# Patient Record
Sex: MALE | Race: WHITE | NOT HISPANIC OR LATINO | Employment: OTHER | URBAN - METROPOLITAN AREA
[De-identification: names, ages, dates, MRNs, and addresses within clinical notes are randomized per-mention and may not be internally consistent; named-entity substitution may affect disease eponyms.]

---

## 2017-06-13 ENCOUNTER — OFFICE VISIT (OUTPATIENT)
Dept: FAMILY MEDICINE | Facility: OTHER | Age: 72
End: 2017-06-13
Attending: FAMILY MEDICINE
Payer: MEDICARE

## 2017-06-13 VITALS
DIASTOLIC BLOOD PRESSURE: 68 MMHG | TEMPERATURE: 97.1 F | OXYGEN SATURATION: 98 % | HEART RATE: 66 BPM | RESPIRATION RATE: 16 BRPM | HEIGHT: 71 IN | SYSTOLIC BLOOD PRESSURE: 104 MMHG | BODY MASS INDEX: 27.58 KG/M2 | WEIGHT: 197 LBS

## 2017-06-13 DIAGNOSIS — M21.612 BUNION, LEFT FOOT: ICD-10-CM

## 2017-06-13 DIAGNOSIS — R51.9 NONINTRACTABLE EPISODIC HEADACHE, UNSPECIFIED HEADACHE TYPE: ICD-10-CM

## 2017-06-13 DIAGNOSIS — R52 PAIN: ICD-10-CM

## 2017-06-13 DIAGNOSIS — E78.5 HYPERLIPIDEMIA LDL GOAL <100: Primary | ICD-10-CM

## 2017-06-13 DIAGNOSIS — Z13.6 SCREENING FOR AAA (ABDOMINAL AORTIC ANEURYSM): ICD-10-CM

## 2017-06-13 LAB
ALT SERPL W P-5'-P-CCNC: 27 U/L (ref 0–70)
CHOLEST SERPL-MCNC: 166 MG/DL
ERYTHROCYTE [DISTWIDTH] IN BLOOD BY AUTOMATED COUNT: 12.8 % (ref 10–15)
GLUCOSE SERPL-MCNC: 93 MG/DL (ref 70–99)
HCT VFR BLD AUTO: 40.8 % (ref 40–53)
HDLC SERPL-MCNC: 65 MG/DL
HGB BLD-MCNC: 14.1 G/DL (ref 13.3–17.7)
LDLC SERPL CALC-MCNC: 87 MG/DL
MCH RBC QN AUTO: 32 PG (ref 26.5–33)
MCHC RBC AUTO-ENTMCNC: 34.6 G/DL (ref 31.5–36.5)
MCV RBC AUTO: 93 FL (ref 78–100)
NONHDLC SERPL-MCNC: 101 MG/DL
PLATELET # BLD AUTO: 260 10E9/L (ref 150–450)
RBC # BLD AUTO: 4.41 10E12/L (ref 4.4–5.9)
TRIGL SERPL-MCNC: 71 MG/DL
TSH SERPL DL<=0.05 MIU/L-ACNC: 0.95 MU/L (ref 0.4–4)
WBC # BLD AUTO: 4.9 10E9/L (ref 4–11)

## 2017-06-13 PROCEDURE — 84443 ASSAY THYROID STIM HORMONE: CPT | Mod: ZL | Performed by: FAMILY MEDICINE

## 2017-06-13 PROCEDURE — 82947 ASSAY GLUCOSE BLOOD QUANT: CPT | Mod: ZL | Performed by: FAMILY MEDICINE

## 2017-06-13 PROCEDURE — 99214 OFFICE O/P EST MOD 30 MIN: CPT | Performed by: FAMILY MEDICINE

## 2017-06-13 PROCEDURE — 80061 LIPID PANEL: CPT | Mod: ZL | Performed by: FAMILY MEDICINE

## 2017-06-13 PROCEDURE — 86618 LYME DISEASE ANTIBODY: CPT | Mod: ZL | Performed by: FAMILY MEDICINE

## 2017-06-13 PROCEDURE — 85027 COMPLETE CBC AUTOMATED: CPT | Mod: ZL | Performed by: FAMILY MEDICINE

## 2017-06-13 PROCEDURE — 36415 COLL VENOUS BLD VENIPUNCTURE: CPT | Mod: ZL | Performed by: FAMILY MEDICINE

## 2017-06-13 PROCEDURE — 99212 OFFICE O/P EST SF 10 MIN: CPT

## 2017-06-13 PROCEDURE — 84460 ALANINE AMINO (ALT) (SGPT): CPT | Mod: ZL | Performed by: FAMILY MEDICINE

## 2017-06-13 RX ORDER — SIMVASTATIN 40 MG
40 TABLET ORAL AT BEDTIME
Qty: 90 TABLET | Refills: 3 | Status: SHIPPED | OUTPATIENT
Start: 2017-06-13 | End: 2018-06-13

## 2017-06-13 ASSESSMENT — PAIN SCALES - GENERAL: PAINLEVEL: NO PAIN (1)

## 2017-06-13 NOTE — NURSING NOTE
"Chief Complaint   Patient presents with     Recheck Medication     needs refills     Headache     onset march april 2 a week apart may only had on the 25th.  none since      Foot Pain     left foot bunion        Initial /68 (BP Location: Left arm, Patient Position: Chair, Cuff Size: Adult Large)  Pulse 66  Temp 97.1  F (36.2  C) (Tympanic)  Resp 16  Ht 5' 10.5\" (1.791 m)  Wt 197 lb (89.4 kg)  SpO2 98%  BMI 27.87 kg/m2 Estimated body mass index is 27.87 kg/(m^2) as calculated from the following:    Height as of this encounter: 5' 10.5\" (1.791 m).    Weight as of this encounter: 197 lb (89.4 kg).  Medication Reconciliation: complete   Pamela M Lechevalier LPN      "

## 2017-06-13 NOTE — MR AVS SNAPSHOT
After Visit Summary   6/13/2017    Napoleon Leroy    MRN: [id number]           Patient Information     Date Of Birth          1945        Visit Information        Provider Department      6/13/2017 10:30 AM Marizol Bell MD [facility]        Today's Diagnoses     Hyperlipidemia LDL goal <100    -  1    Bunion, left foot        Nonintractable episodic headache, unspecified headache type        Pain         Screening for AAA (abdominal aortic aneurysm)           Follow-ups after your visit        Additional Services     PODIATRY/FOOT & ANKLE SURGERY REFERRAL       Your provider has referred you to: Dr. Petr Hummel    Please be aware that coverage of these services is subject to the terms and limitations of your health insurance plan.  Call member services at your health plan with any benefit or coverage questions.      Please bring the following to your appointment:  >>   Any x-rays, CTs or MRIs which have been performed.  Contact the facility where they were done to arrange for  prior to your scheduled appointment.    >>   List of current medications   >>   This referral request   >>   Any documents/labs given to you for this referral                  Follow-up notes from your care team     Return in about 6 months (around 12/13/2017).      Who to contact     If you have questions or need follow up information about today's clinic visit or your schedule please contact [facility] directly at [phone].  Normal or non-critical lab and imaging results will be communicated to you by MyChart, letter or phone within 4 business days after the clinic has received the results. If you do not hear from us within 7 days, please contact the clinic through MyChart or phone. If you have a critical or abnormal lab result, we will notify you by phone as soon as possible.  Submit refill requests through [employer] or call your pharmacy and they will forward the refill  "request to us. Please allow 3 business days for your refill to be completed.          Additional Information About Your Visit        [employer] Information     [employer] lets you send messages to your doctor, view your test results, renew your prescriptions, schedule appointments and more. To sign up, go to [email or web address] . Click on \"Log in\" on the left side of the screen, which will take you to the Welcome page. Then click on \"Sign up Now\" on the right side of the page.     You will be asked to enter the access code listed below, as well as some personal information. Please follow the directions to create your username and password.     Your access code is: [email or web address]  Expires: 2017  3:11 PM     Your access code will  in 90 days. If you need help or a new code, please call your [facility] clinic or [phone].        Care EveryWhere ID     This is your Care EveryWhere ID. This could be used by other organizations to access your [facility] medical records  [id number]        Your Vitals Were     Pulse Temperature Respirations Height Pulse Oximetry BMI (Body Mass Index)    66 97.1  F (36.2  C) (Tympanic) 16 5' 10.5\" (1.791 m) 98% 27.87 kg/m2       Blood Pressure from Last 3 Encounters:   17 104/68   16 116/70   08/19/15 118/60    Weight from Last 3 Encounters:   17 197 lb (89.4 kg)   16 202 lb (91.6 kg)   08/19/15 210 lb (95.3 kg)              We Performed the Following     Abdominal Aortic Aneurysm Screening/Tracking     ALT     CBC with platelets     Glucose     Lipid Profile     Lyme Disease Anju with reflex to WB Serum     PODIATRY/FOOT & ANKLE SURGERY REFERRAL     TSH          Where to get your medicines      These medications were sent to [facility] - [address] [address]     Phone:  [phone]     simvastatin 40 MG tablet          Primary Care Provider Office Phone # Fax #    Marizol Roque " MD [place] [phone]       [facility] [address] [facility] [address]        Thank you!     Thank you for choosing [facility]  for your care. Our goal is always to provide you with excellent care. Hearing back from our patients is one way we can continue to improve our services. Please take a few minutes to complete the written survey that you may receive in the mail after your visit with us. Thank you!             Your Updated Medication List - Protect others around you: Learn how to safely use, store and throw away your medicines at www.disposemymeds.org.          This list is accurate as of: 6/13/17  3:11 PM.  Always use your most recent med list.                   Brand Name Dispense Instructions for use    aspirin 81 MG tablet      Take by mouth daily       DAILY MULTIVITAMIN PO      Take by mouth daily       fish oil-omega-3 fatty acids 1000 MG capsule      Take 1 g by mouth daily       GARLIC PO      0.5 CLOVE DAILY       IBUPROFEN PO      Take 200 mg by mouth Takes 2 tabs as needed       simvastatin 40 MG tablet    ZOCOR    90 tablet    Take 1 tablet (40 mg) by mouth At Bedtime       UNABLE TO FIND      daily cinnamine       VITAMIN C PO      Take 500 mg by mouth daily       VITAMIN D3 PO      Take by mouth daily       zinc sulfate 220 (50 ZN) MG capsule    ZINCATE     Take 220 mg by mouth daily

## 2017-06-13 NOTE — LETTER
[facility]  [address]  [place] [address]  [phone]    Angelic 15, 2017    Napoleon Leroy  [address] [place] [address]    Dear Napoleon,    Notes Recorded by Marizol Bell MD on 6/15/2017 at 1:22 PM  Normal/negative results     Results for orders placed or performed in visit on 06/13/17   Abdominal Aortic Aneurysm Screening/Tracking    Narrative    Pt has never smoked, no family history of AAA; does not meet criteria for screening     Lipid Profile   Result Value Ref Range    Cholesterol 166 <200 mg/dL    Triglycerides 71 <150 mg/dL    HDL Cholesterol 65 >39 mg/dL    LDL Cholesterol Calculated 87 <100 mg/dL    Non HDL Cholesterol 101 <130 mg/dL   ALT   Result Value Ref Range    ALT 27 0 - 70 U/L   Glucose   Result Value Ref Range    Glucose 93 70 - 99 mg/dL   CBC with platelets   Result Value Ref Range    WBC 4.9 4.0 - 11.0 10e9/L    RBC Count 4.41 4.4 - 5.9 10e12/L    Hemoglobin 14.1 13.3 - 17.7 g/dL    Hematocrit 40.8 40.0 - 53.0 %    MCV 93 78 - 100 fl    MCH 32.0 26.5 - 33.0 pg    MCHC 34.6 31.5 - 36.5 g/dL    RDW 12.8 10.0 - 15.0 %    Platelet Count 260 150 - 450 10e9/L   TSH   Result Value Ref Range    TSH 0.95 0.40 - 4.00 mU/L   Lyme Disease Anju with reflex to WB Serum   Result Value Ref Range    Lyme Disease Antibodies Serum 0.08 0.00 - 0.89

## 2017-06-13 NOTE — PROGRESS NOTES
SUBJECTIVE:                                                    Napoleon Leroy is a 72 year old male who presents to clinic today for the following health issues:    Hyperlipidemia Follow-Up      Rate your low fat/cholesterol diet?: good    Taking statin?  Yes, no muscle aches from statin    Other lipid medications/supplements?:  none     Headaches      Duration: few months    Description  Location: bilateral in the frontal area   Character: dull pain  Frequency:  One headache, then another about a week later, happened in March and April, then only one headache at the end of May and none since  Duration:  Last a few hours, then resolves    Intensity:  moderate    Accompanying signs and symptoms: none    Precipitating or Alleviating factors:  Nausea/vomiting: no  Dizziness: no  Weakness or numbness: no  Visual changes: none  Fever: no   Sinus or URI symptoms no     History  Head trauma: no  Family history of migraines: no  Previous tests for headaches: no  Neurologist evaluations: no  Able to do daily activities when headache present: YES  Wake with headaches: no  Daily pain medication use: no  Any changes in: none    Precipitating or Alleviating factors (light/sound/sleep/caffeine): ASA helps a little    Therapies tried and outcome: ASA    Outcome - mild improvement  Frequent/daily pain medication use: no     Musculoskeletal problem/pain      Duration: years    Description  Location: left foot at 1st MTP joint    Intensity:  moderate    Accompanying signs and symptoms: none    History  Previous similar problem: no   Previous evaluation:  Podiatry appointment many years ago    Precipitating or alleviating factors:  Trauma or overuse: no   Aggravating factors include: walking    Therapies tried and outcome: inserts help, but if walks without inserts, pain worsens       Problem list and histories reviewed & adjusted, as indicated.  Additional history: as documented    Patient Active Problem List   Diagnosis      Advanced care planning/counseling discussion     Hyperlipidemia     Osteoarthritis of shoulder     Past Surgical History:   Procedure Laterality Date     casting      fracture-arm, RT     COLONOSCOPY  2003     COLONOSCOPY N/A 3/20/2015    Procedure: COLONOSCOPY;  Surgeon: Julio Hinds DO;  Location: [facility]     inguinal hernia repair       ORTHOPEDIC SURGERY Left 4-    Total shoulder arthoscopy     TONSILLECTOMY         Social History   Substance Use Topics     Smoking status: Never Smoker     Smokeless tobacco: Never Used      Comment: no passive exposure     Alcohol use No     Family History   Problem Relation Age of Onset     HEART DISEASE Father 48     disease, cause of death     Myocardial Infarction Mother 65     myocardial infarction, cause of death     CANCER Sister 32     hodgkin's disease, cause of death         Current Outpatient Prescriptions   Medication Sig Dispense Refill     UNABLE TO FIND daily cinnamine       simvastatin (ZOCOR) 40 MG tablet Take 1 tablet (40 mg) by mouth At Bedtime 90 tablet 3     Cholecalciferol (VITAMIN D3 PO) Take by mouth daily       Ascorbic Acid (VITAMIN C PO) Take 500 mg by mouth daily       zinc sulfate (ZINCATE) 220 MG capsule Take 220 mg by mouth daily       IBUPROFEN PO Take 200 mg by mouth Takes 2 tabs as needed       aspirin 81 MG tablet Take by mouth daily       Multiple Vitamin (DAILY MULTIVITAMIN PO) Take by mouth daily       fish oil-omega-3 fatty acids (FISH OIL) 1000 MG capsule Take 1 g by mouth daily       GARLIC PO 0.5 CLOVE DAILY       [DISCONTINUED] simvastatin (ZOCOR) 40 MG tablet Take 1 tablet (40 mg) by mouth At Bedtime 90 tablet 3     No Known Allergies    Reviewed and updated as needed this visit by clinical staff  Tobacco  Allergies  Meds  Problems       Reviewed and updated as needed this visit by Provider         ROS:  Constitutional, HEENT, cardiovascular, pulmonary, gi and gu systems are negative, except as otherwise  "noted.    OBJECTIVE:                                                    /68 (BP Location: Left arm, Patient Position: Chair, Cuff Size: Adult Large)  Pulse 66  Temp 97.1  F (36.2  C) (Tympanic)  Resp 16  Ht 5' 10.5\" (1.791 m)  Wt 197 lb (89.4 kg)  SpO2 98%  BMI 27.87 kg/m2  Body mass index is 27.87 kg/(m^2).  GENERAL: healthy, alert and no distress  MS: significant bunion formation with angulation on left foot  PSYCH: mentation appears normal and affect normal/bright    Diagnostic Test Results:  none      ASSESSMENT/PLAN:                                                    Hyperlipidemia; controlled   Plan:  Labs:   See orders        ICD-10-CM    1. Hyperlipidemia LDL goal <100 E78.5 simvastatin (ZOCOR) 40 MG tablet     Lipid Profile     ALT     Glucose   2. Bunion, left foot M21.612 PODIATRY/FOOT & ANKLE SURGERY REFERRAL   3. Nonintractable episodic headache, unspecified headache type R51 CBC with platelets     TSH     Lyme Disease Anju with reflex to WB Serum   4. Pain  R52 TSH       FUTURE APPOINTMENTS:       - Follow-up visit in 6 months, sooner as needed.      Marizol Bell MD  [facility]    "

## 2017-06-14 ENCOUNTER — DOCUMENTATION ONLY (OUTPATIENT)
Dept: VASCULAR SURGERY | Facility: CLINIC | Age: 72
End: 2017-06-14

## 2017-06-15 LAB — B BURGDOR IGG+IGM SER QL: 0.08 (ref 0–0.89)

## 2017-06-19 ENCOUNTER — TELEPHONE (OUTPATIENT)
Dept: FAMILY MEDICINE | Facility: OTHER | Age: 72
End: 2017-06-19

## 2017-06-19 NOTE — TELEPHONE ENCOUNTER
11:21 AM    Reason for Call: Phone Call    Description: Pt called and states that he has some questions about his refills on his medication.    Was an appointment offered for this call? No    Preferred method for responding to this message: Telephone Call    If we cannot reach you directly, may we leave a detailed response at the number you provided? Yes    Can this message wait until your PCP/provider returns, if available today? Not applicable,     Rosio Abdullahi

## 2018-06-13 ENCOUNTER — OFFICE VISIT (OUTPATIENT)
Dept: FAMILY MEDICINE | Facility: OTHER | Age: 73
End: 2018-06-13
Attending: FAMILY MEDICINE
Payer: MEDICARE

## 2018-06-13 VITALS
BODY MASS INDEX: 29.96 KG/M2 | DIASTOLIC BLOOD PRESSURE: 72 MMHG | SYSTOLIC BLOOD PRESSURE: 114 MMHG | RESPIRATION RATE: 14 BRPM | HEIGHT: 71 IN | HEART RATE: 60 BPM | WEIGHT: 214 LBS | TEMPERATURE: 97.3 F

## 2018-06-13 DIAGNOSIS — E78.5 HYPERLIPIDEMIA LDL GOAL <100: Primary | ICD-10-CM

## 2018-06-13 DIAGNOSIS — R51.9 NONINTRACTABLE EPISODIC HEADACHE, UNSPECIFIED HEADACHE TYPE: ICD-10-CM

## 2018-06-13 DIAGNOSIS — Z23 NEED FOR VACCINATION WITH 13-POLYVALENT PNEUMOCOCCAL CONJUGATE VACCINE: ICD-10-CM

## 2018-06-13 DIAGNOSIS — Z11.59 NEED FOR HEPATITIS C SCREENING TEST: ICD-10-CM

## 2018-06-13 DIAGNOSIS — Z23 NEED FOR TETANUS BOOSTER: ICD-10-CM

## 2018-06-13 LAB
ALT SERPL W P-5'-P-CCNC: 21 U/L (ref 0–70)
CHOLEST SERPL-MCNC: 193 MG/DL
HDLC SERPL-MCNC: 59 MG/DL
LDLC SERPL CALC-MCNC: 116 MG/DL
NONHDLC SERPL-MCNC: 134 MG/DL
TRIGL SERPL-MCNC: 91 MG/DL

## 2018-06-13 PROCEDURE — G0472 HEP C SCREEN HIGH RISK/OTHER: HCPCS | Mod: ZL | Performed by: FAMILY MEDICINE

## 2018-06-13 PROCEDURE — 36415 COLL VENOUS BLD VENIPUNCTURE: CPT | Mod: ZL | Performed by: FAMILY MEDICINE

## 2018-06-13 PROCEDURE — 90471 IMMUNIZATION ADMIN: CPT | Mod: GY | Performed by: FAMILY MEDICINE

## 2018-06-13 PROCEDURE — 80061 LIPID PANEL: CPT | Mod: ZL | Performed by: FAMILY MEDICINE

## 2018-06-13 PROCEDURE — 99214 OFFICE O/P EST MOD 30 MIN: CPT | Performed by: FAMILY MEDICINE

## 2018-06-13 PROCEDURE — G0463 HOSPITAL OUTPT CLINIC VISIT: HCPCS | Mod: 25

## 2018-06-13 PROCEDURE — 84460 ALANINE AMINO (ALT) (SGPT): CPT | Mod: ZL | Performed by: FAMILY MEDICINE

## 2018-06-13 PROCEDURE — G0463 HOSPITAL OUTPT CLINIC VISIT: HCPCS

## 2018-06-13 PROCEDURE — 90714 TD VACC NO PRESV 7 YRS+ IM: CPT | Mod: GY | Performed by: FAMILY MEDICINE

## 2018-06-13 RX ORDER — SUMATRIPTAN 100 MG/1
100 TABLET, FILM COATED ORAL
Qty: 9 TABLET | Refills: 1 | Status: SHIPPED | OUTPATIENT
Start: 2018-06-13 | End: 2020-11-27

## 2018-06-13 RX ORDER — SIMVASTATIN 40 MG
40 TABLET ORAL AT BEDTIME
Qty: 90 TABLET | Refills: 3 | Status: SHIPPED | OUTPATIENT
Start: 2018-06-13 | End: 2019-06-21

## 2018-06-13 ASSESSMENT — ANXIETY QUESTIONNAIRES
2. NOT BEING ABLE TO STOP OR CONTROL WORRYING: NOT AT ALL
3. WORRYING TOO MUCH ABOUT DIFFERENT THINGS: NOT AT ALL
6. BECOMING EASILY ANNOYED OR IRRITABLE: NOT AT ALL
GAD7 TOTAL SCORE: 0
1. FEELING NERVOUS, ANXIOUS, OR ON EDGE: NOT AT ALL
4. TROUBLE RELAXING: NOT AT ALL
7. FEELING AFRAID AS IF SOMETHING AWFUL MIGHT HAPPEN: NOT AT ALL
5. BEING SO RESTLESS THAT IT IS HARD TO SIT STILL: NOT AT ALL

## 2018-06-13 ASSESSMENT — PAIN SCALES - GENERAL: PAINLEVEL: NO PAIN (0)

## 2018-06-13 NOTE — PROGRESS NOTES
SUBJECTIVE:   Napoleon Leroy is a 73 year old male who presents to clinic today for the following health issues:      Hyperlipidemia Follow-Up      Rate your low fat/cholesterol diet?: fair    Taking statin?  Yes, no muscle aches from statin    Other lipid medications/supplements?:  Fish oil/Omega 3, dose 1000 without side effects      Amount of exercise or physical activity: 6-7 days/week for an average of greater than 60 minutes    Problems taking medications regularly: No    Medication side effects: none    Diet: regular (no restrictions)        Headaches      Duration: over a year    Description  Location: bilateral in the frontal area   Character: dull pain  Frequency:  Once a month or every other month    Intensity:  moderate    Accompanying signs and symptoms:    Precipitating or Alleviating factors:  Nausea/vomiting: no  Dizziness: no  Weakness or numbness: no  Visual changes: none  Fever: no   Sinus or URI symptoms no     History  Head trauma: no  Family history of migraines: no  Previous tests for headaches: no  Neurologist evaluations: no  Able to do daily activities when headache present: YES  Wake with headaches: no  Daily pain medication use: no  Any changes in: none    Precipitating or Alleviating factors (light/sound/sleep/caffeine): none    Therapies tried and outcome: Ibuprofen (Advil, Motrin)    Outcome - minimally effective  Frequent/daily pain medication use: no        Problem list and histories reviewed & adjusted, as indicated.  Additional history: as documented    Patient Active Problem List   Diagnosis     Advanced care planning/counseling discussion     Hyperlipidemia     Osteoarthritis of shoulder     Past Surgical History:   Procedure Laterality Date     casting      fracture-arm, RT     COLONOSCOPY  2003     COLONOSCOPY N/A 3/20/2015    Procedure: COLONOSCOPY;  Surgeon: Julio Hinds DO;  Location: [facility]     inguinal hernia repair       ORTHOPEDIC SURGERY Left 4-     "Total shoulder arthoscopy     TONSILLECTOMY         Social History   Substance Use Topics     Smoking status: Never Smoker     Smokeless tobacco: Never Used      Comment: no passive exposure     Alcohol use No     Family History   Problem Relation Age of Onset     HEART DISEASE Father 48     disease, cause of death     Myocardial Infarction Mother 65     myocardial infarction, cause of death     CANCER Sister 32     hodgkin's disease, cause of death         Current Outpatient Prescriptions   Medication Sig Dispense Refill     Ascorbic Acid (VITAMIN C PO) Take 500 mg by mouth daily       aspirin 81 MG tablet Take by mouth daily       Cholecalciferol (VITAMIN D3 PO) Take by mouth daily       fish oil-omega-3 fatty acids (FISH OIL) 1000 MG capsule Take 1 g by mouth daily       GARLIC PO 0.5 CLOVE DAILY       IBUPROFEN PO Take 200 mg by mouth Takes 2 tabs as needed       Multiple Vitamin (DAILY MULTIVITAMIN PO) Take by mouth daily       simvastatin (ZOCOR) 40 MG tablet Take 1 tablet (40 mg) by mouth At Bedtime 90 tablet 3     SUMAtriptan (IMITREX) 100 MG tablet Take 1 tablet (100 mg) by mouth at onset of headache for migraine May repeat in 2 hours. Max 2 tablets/24 hours. 9 tablet 1     UNABLE TO FIND daily cinnamine       zinc sulfate (ZINCATE) 220 MG capsule Take 220 mg by mouth daily       [DISCONTINUED] simvastatin (ZOCOR) 40 MG tablet Take 1 tablet (40 mg) by mouth At Bedtime 90 tablet 3     No Known Allergies    Reviewed and updated as needed this visit by clinical staff  Tobacco  Allergies  Meds  Problems       Reviewed and updated as needed this visit by Provider         ROS:  Constitutional, HEENT, cardiovascular, pulmonary, gi and gu systems are negative, except as otherwise noted.    OBJECTIVE:     /72 (BP Location: Left arm, Patient Position: Sitting, Cuff Size: Adult Regular)  Pulse 60  Temp 97.3  F (36.3  C) (Tympanic)  Resp 14  Ht 5' 10.5\" (1.791 m)  Wt 214 lb (97.1 kg)  BMI 30.27 " kg/m2  Body mass index is 30.27 kg/(m^2).  GENERAL: healthy, alert and no distress  PSYCH: mentation appears normal, affect normal/bright    Diagnostic Test Results:  none     ASSESSMENT/PLAN:     Hyperlipidemia; controlled   Plan:  Labs:   Lipid and ALT        ICD-10-CM    1. Hyperlipidemia LDL goal <100 E78.5 simvastatin (ZOCOR) 40 MG tablet     Lipid Profile     ALT   2. Nonintractable episodic headache, unspecified headache type R51 SUMAtriptan (IMITREX) 100 MG tablet   3. Need for hepatitis C screening test Z11.59 Hepatitis C Screen Reflex to HCV RNA Quant and Genotype   4. Need for vaccination with 13-polyvalent pneumococcal conjugate vaccine Z23    5. Need for tetanus booster Z23 TD PRESERV FREE, IM (7+ YRS)     ADMIN 1st VACCINE       FUTURE APPOINTMENTS:       - Follow-up for annual visit or as needed    Marizol Bell MD  [facility]

## 2018-06-13 NOTE — MR AVS SNAPSHOT
"              After Visit Summary   6/13/2018    Napoleon Leroy    MRN: [id number]           Patient Information     Date Of Birth          1945        Visit Information        Provider Department      6/13/2018 10:15 AM Marizol Bell MD [facility]        Today's Diagnoses     Hyperlipidemia LDL goal <100    -  1    Nonintractable episodic headache, unspecified headache type        Need for hepatitis C screening test        Need for vaccination with 13-polyvalent pneumococcal conjugate vaccine        Need for tetanus booster           Follow-ups after your visit        Follow-up notes from your care team     Return in about 1 year (around 6/13/2019).      Who to contact     If you have questions or need follow up information about today's clinic visit or your schedule please contact [facility] directly at [phone].  Normal or non-critical lab and imaging results will be communicated to you by MyChart, letter or phone within 4 business days after the clinic has received the results. If you do not hear from us within 7 days, please contact the clinic through MyChart or phone. If you have a critical or abnormal lab result, we will notify you by phone as soon as possible.  Submit refill requests through [employer] or call your pharmacy and they will forward the refill request to us. Please allow 3 business days for your refill to be completed.          Additional Information About Your Visit        Care EveryWhere ID     This is your Care EveryWhere ID. This could be used by other organizations to access your [facility] medical records  [id number]        Your Vitals Were     Pulse Temperature Respirations Height BMI (Body Mass Index)       60 97.3  F (36.3  C) (Tympanic) 14 5' 10.5\" (1.791 m) 30.27 kg/m2        Blood Pressure from Last 3 Encounters:   06/13/18 114/72   06/13/17 104/68   04/21/16 116/70    Weight from Last 3 Encounters:   06/13/18 214 lb (97.1 kg)   06/13/17 197 " lb (89.4 kg)   04/21/16 202 lb (91.6 kg)              We Performed the Following     ADMIN 1st VACCINE     ALT     Hepatitis C Screen Reflex to HCV RNA Quant and Genotype     Lipid Profile     TD PRESERV FREE, IM (7+ YRS)          Today's Medication Changes          These changes are accurate as of 6/13/18  1:28 PM.  If you have any questions, ask your nurse or doctor.               Start taking these medicines.        Dose/Directions    SUMAtriptan 100 MG tablet   Commonly known as:  IMITREX   Used for:  Nonintractable episodic headache, unspecified headache type   Started by:  Marizol Bell MD        Dose:  100 mg   Take 1 tablet (100 mg) by mouth at onset of headache for migraine May repeat in 2 hours. Max 2 tablets/24 hours.   Quantity:  9 tablet   Refills:  1            Where to get your medicines      These medications were sent to [facility] - [address] [address]     Phone:  [phone]     simvastatin 40 MG tablet    SUMAtriptan 100 MG tablet                Primary Care Provider Office Phone # Fax #    Marizol Bell [phone] [address] [facility] [address]        Equal Access to Services     [facility] FRANCISCO AH: Hadii eli prestono Sosunshine, waaxda luqadaha, qaybta kaalmada adeegyada, kamran nguyen. So [facility] [phone].    ATENCIÓN: Si habla español, tiene a carson disposición servicios gratuitos de asistencia lingüística. Matilde al [phone].    We comply with applicable federal civil rights laws and [place] laws. We do not discriminate on the basis of race, color, national origin, age, disability, sex, sexual orientation, or gender identity.            Thank you!     Thank you for choosing [facility]  for your care. Our goal is always to provide you with excellent care. Hearing back from our patients is one way we can continue to  improve our services. Please take a few minutes to complete the written survey that you may receive in the mail after your visit with us. Thank you!             Your Updated Medication List - Protect others around you: Learn how to safely use, store and throw away your medicines at www.disposemymeds.org.          This list is accurate as of 6/13/18  1:28 PM.  Always use your most recent med list.                   Brand Name Dispense Instructions for use Diagnosis    aspirin 81 MG tablet      Take by mouth daily        DAILY MULTIVITAMIN PO      Take by mouth daily        fish oil-omega-3 fatty acids 1000 MG capsule      Take 1 g by mouth daily        GARLIC PO      0.5 CLOVE DAILY        IBUPROFEN PO      Take 200 mg by mouth Takes 2 tabs as needed        simvastatin 40 MG tablet    ZOCOR    90 tablet    Take 1 tablet (40 mg) by mouth At Bedtime    Hyperlipidemia LDL goal <100       SUMAtriptan 100 MG tablet    IMITREX    9 tablet    Take 1 tablet (100 mg) by mouth at onset of headache for migraine May repeat in 2 hours. Max 2 tablets/24 hours.    Nonintractable episodic headache, unspecified headache type       UNABLE TO FIND      daily cinnamine        VITAMIN C PO      Take 500 mg by mouth daily        VITAMIN D3 PO      Take by mouth daily        zinc sulfate 220 (50 Zn) MG capsule    ZINCATE     Take 220 mg by mouth daily

## 2018-06-13 NOTE — LETTER
June 14, 2018      Napoleon Herrmann  [address]  [place] [address]        Dear Napoleon,         Results for orders placed or performed in visit on 06/13/18   Lipid Profile   Result Value Ref Range    Cholesterol 193 <200 mg/dL    Triglycerides 91 <150 mg/dL    HDL Cholesterol 59 >39 mg/dL    LDL Cholesterol Calculated 116 (H) <100 mg/dL    Non HDL Cholesterol 134 (H) <130 mg/dL   ALT   Result Value Ref Range    ALT 21 0 - 70 U/L   Hepatitis C Screen Reflex to HCV RNA Quant and Genotype   Result Value Ref Range    Hepatitis C Antibody Nonreactive NR^Nonreactive               Sincerely,        Marizol Bell MD

## 2018-06-13 NOTE — LETTER
June 14, 2018      Napoleon QUARLES [facility]  [address]  [place] [address]        Dear Napoleon,     Normal/negative results.  Results for orders placed or performed in visit on 06/13/18   Lipid Profile   Result Value Ref Range    Cholesterol 193 <200 mg/dL    Triglycerides 91 <150 mg/dL    HDL Cholesterol 59 >39 mg/dL    LDL Cholesterol Calculated 116 (H) <100 mg/dL    Non HDL Cholesterol 134 (H) <130 mg/dL   ALT   Result Value Ref Range    ALT 21 0 - 70 U/L   Hepatitis C Screen Reflex to HCV RNA Quant and Genotype   Result Value Ref Range    Hepatitis C Antibody Nonreactive NR^Nonreactive               Sincerely,        Marizol Bell MD

## 2018-06-14 LAB — HCV AB SERPL QL IA: NONREACTIVE

## 2018-06-14 ASSESSMENT — ANXIETY QUESTIONNAIRES: GAD7 TOTAL SCORE: 0

## 2018-06-14 ASSESSMENT — PATIENT HEALTH QUESTIONNAIRE - PHQ9: SUM OF ALL RESPONSES TO PHQ QUESTIONS 1-9: 0

## 2018-06-22 ENCOUNTER — OFFICE VISIT (OUTPATIENT)
Dept: FAMILY MEDICINE | Facility: OTHER | Age: 73
End: 2018-06-22
Attending: FAMILY MEDICINE
Payer: MEDICARE

## 2018-06-22 VITALS
SYSTOLIC BLOOD PRESSURE: 100 MMHG | HEART RATE: 60 BPM | WEIGHT: 205 LBS | RESPIRATION RATE: 14 BRPM | BODY MASS INDEX: 29 KG/M2 | DIASTOLIC BLOOD PRESSURE: 60 MMHG | TEMPERATURE: 97 F
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
## 2018-06-22 PROCEDURE — G0463 HOSPITAL OUTPT CLINIC VISIT: HCPCS

## 2018-06-22 PROCEDURE — 99213 OFFICE O/P EST LOW 20 MIN: CPT | Performed by: FAMILY MEDICINE

## 2018-06-22 PROCEDURE — 86618 LYME DISEASE ANTIBODY: CPT | Mod: ZL | Performed by: FAMILY MEDICINE

## 2018-06-22 PROCEDURE — 36415 COLL VENOUS BLD VENIPUNCTURE: CPT | Mod: ZL | Performed by: FAMILY MEDICINE

## 2018-06-22 ASSESSMENT — ANXIETY QUESTIONNAIRES
5. BEING SO RESTLESS THAT IT IS HARD TO SIT STILL: NOT AT ALL
2. NOT BEING ABLE TO STOP OR CONTROL WORRYING: NOT AT ALL
IF YOU CHECKED OFF ANY PROBLEMS ON THIS QUESTIONNAIRE, HOW DIFFICULT HAVE THESE PROBLEMS MADE IT FOR YOU TO DO YOUR WORK, TAKE CARE OF THINGS AT HOME, OR GET ALONG WITH OTHER PEOPLE: NOT DIFFICULT AT ALL
1. FEELING NERVOUS, ANXIOUS, OR ON EDGE: NOT AT ALL
3. WORRYING TOO MUCH ABOUT DIFFERENT THINGS: NOT AT ALL
GAD7 TOTAL SCORE: 0
4. TROUBLE RELAXING: NOT AT ALL
6. BECOMING EASILY ANNOYED OR IRRITABLE: NOT AT ALL
7. FEELING AFRAID AS IF SOMETHING AWFUL MIGHT HAPPEN: NOT AT ALL

## 2018-06-22 ASSESSMENT — PAIN SCALES - GENERAL: PAINLEVEL: NO PAIN (0)

## 2018-06-22 NOTE — NURSING NOTE
"Chief Complaint   Patient presents with     Insect Bites       Initial /60 (BP Location: Left arm, Patient Position: Sitting, Cuff Size: Adult Large)  Pulse 60  Temp 97  F (36.1  C)  Resp 14  Wt 205 lb (93 kg)  BMI 29 kg/m2 Estimated body mass index is 29 kg/(m^2) as calculated from the following:    Height as of 6/13/18: 5' 10.5\" (1.791 m).    Weight as of this encounter: 205 lb (93 kg).  Medication Reconciliation: complete    Diana Donnelly LPN    "

## 2018-06-22 NOTE — MR AVS SNAPSHOT
After Visit Summary   6/22/2018    Napoleon Leroy    MRN: [id number]           Patient Information     Date Of Birth          1945        Visit Information        Provider Department      6/22/2018 11:15 AM Marizol Bell MD [facility]        Today's Diagnoses     Bug bite, initial encounter    -  1       Follow-ups after your visit        Follow-up notes from your care team     Return if symptoms worsen or fail to improve.      Who to contact     If you have questions or need follow up information about today's clinic visit or your schedule please contact [facility] directly at [phone].  Normal or non-critical lab and imaging results will be communicated to you by MyChart, letter or phone within 4 business days after the clinic has received the results. If you do not hear from us within 7 days, please contact the clinic through MyChart or phone. If you have a critical or abnormal lab result, we will notify you by phone as soon as possible.  Submit refill requests through [employer] or call your pharmacy and they will forward the refill request to us. Please allow 3 business days for your refill to be completed.          Additional Information About Your Visit        Care EveryWhere ID     This is your Care EveryWhere ID. This could be used by other organizations to access your [facility] medical records  [id number]        Your Vitals Were     Pulse Temperature Respirations BMI (Body Mass Index)          60 97  F (36.1  C) 14 29 kg/m2         Blood Pressure from Last 3 Encounters:   06/22/18 100/60   06/13/18 114/72   06/13/17 104/68    Weight from Last 3 Encounters:   06/22/18 205 lb (93 kg)   06/13/18 214 lb (97.1 kg)   06/13/17 197 lb (89.4 kg)              We Performed the Following     Lyme Disease Anju with reflex to WB Serum        Primary Care Provider Office Phone # Fax #    Marizol Bell [phone]  [facility] [id number]        Equal Access to Services     [facility] Center[facility]YAL : Hadii aad ku hadjinaashley Soevanali, waaxda luqadaha, qaybta kaalmakamran ayala. So [facility] [phone].    ATENCIÓN: Si habla español, tiene a carson disposición servicios gratuitos de asistencia lingüística. Natalioame al [phone].    We comply with applicable federal civil rights laws and [place] laws. We do not discriminate on the basis of race, color, national origin, age, disability, sex, sexual orientation, or gender identity.            Thank you!     Thank you for choosing [facility]  for your care. Our goal is always to provide you with excellent care. Hearing back from our patients is one way we can continue to improve our services. Please take a few minutes to complete the written survey that you may receive in the mail after your visit with us. Thank you!             Your Updated Medication List - Protect others around you: Learn how to safely use, store and throw away your medicines at www.disposemymeds.org.          This list is accurate as of 6/22/18  5:00 PM.  Always use your most recent med list.                   Brand Name Dispense Instructions for use Diagnosis    aspirin 81 MG tablet      Take by mouth daily        DAILY MULTIVITAMIN PO      Take by mouth daily        fish oil-omega-3 fatty acids 1000 MG capsule      Take 1 g by mouth daily        GARLIC PO      0.5 CLOVE DAILY        IBUPROFEN PO      Take 200 mg by mouth Takes 2 tabs as needed        simvastatin 40 MG tablet    ZOCOR    90 tablet    Take 1 tablet (40 mg) by mouth At Bedtime    Hyperlipidemia LDL goal <100       SUMAtriptan 100 MG tablet    IMITREX    9 tablet    Take 1 tablet (100 mg) by mouth at onset of headache for migraine May repeat in 2 hours. Max 2 tablets/24 hours.    Nonintractable episodic headache, unspecified headache type       UNABLE TO FIND      daily cinnamine        VITAMIN C PO      Take  500 mg by mouth daily        VITAMIN D3 PO      Take by mouth daily        zinc sulfate 220 (50 Zn) MG capsule    ZINCATE     Take 220 mg by mouth daily

## 2018-06-22 NOTE — PROGRESS NOTES
SUBJECTIVE:   Napoleon Leroy is a 73 year old male who presents to clinic today for the following health issues:      Tick bite      Duration: noted 2 days ago    Description  Location: left forearm  Itching: no    Intensity:  Mild, slight red bulls eye     Accompanying signs and symptoms: None    History (similar episodes/previous evaluation): None    Precipitating or alleviating factors:    Patient states he first noted the lesion 2 days ago, but states he is not sure when it started.  He never saw a tick, but would feel better if we checked a Lyme titer.       Problem list and histories reviewed & adjusted, as indicated.  Additional history: as documented    Patient Active Problem List   Diagnosis     Advanced care planning/counseling discussion     Hyperlipidemia     Osteoarthritis of shoulder     Past Surgical History:   Procedure Laterality Date     casting      fracture-arm, RT     COLONOSCOPY  2003     COLONOSCOPY N/A 3/20/2015    Procedure: COLONOSCOPY;  Surgeon: Julio Hinds DO;  Location: [facility]     inguinal hernia repair       ORTHOPEDIC SURGERY Left 4-    Total shoulder arthoscopy     TONSILLECTOMY         Social History   Substance Use Topics     Smoking status: Never Smoker     Smokeless tobacco: Never Used      Comment: no passive exposure     Alcohol use No     Family History   Problem Relation Age of Onset     HEART DISEASE Father 48     disease, cause of death     Myocardial Infarction Mother 65     myocardial infarction, cause of death     Cancer Sister 32     hodgkin's disease, cause of death         Current Outpatient Prescriptions   Medication Sig Dispense Refill     Ascorbic Acid (VITAMIN C PO) Take 500 mg by mouth daily       aspirin 81 MG tablet Take by mouth daily       Cholecalciferol (VITAMIN D3 PO) Take by mouth daily       fish oil-omega-3 fatty acids (FISH OIL) 1000 MG capsule Take 1 g by mouth daily       GARLIC PO 0.5 CLOVE DAILY       IBUPROFEN PO Take 200 mg by  mouth Takes 2 tabs as needed       Multiple Vitamin (DAILY MULTIVITAMIN PO) Take by mouth daily       simvastatin (ZOCOR) 40 MG tablet Take 1 tablet (40 mg) by mouth At Bedtime 90 tablet 3     SUMAtriptan (IMITREX) 100 MG tablet Take 1 tablet (100 mg) by mouth at onset of headache for migraine May repeat in 2 hours. Max 2 tablets/24 hours. 9 tablet 1     UNABLE TO FIND daily cinnamine       zinc sulfate (ZINCATE) 220 MG capsule Take 220 mg by mouth daily       No Known Allergies    Reviewed and updated as needed this visit by clinical staff  Tobacco  Allergies  Meds  Med Hx  Surg Hx  Fam Hx  Soc Hx      Reviewed and updated as needed this visit by Provider         ROS:  Constitutional, HEENT, cardiovascular, pulmonary, gi and gu systems are negative, except as otherwise noted.    OBJECTIVE:     /60 (BP Location: Left arm, Patient Position: Sitting, Cuff Size: Adult Large)  Pulse 60  Temp 97  F (36.1  C)  Resp 14  Wt 205 lb (93 kg)  BMI 29 kg/m2  Body mass index is 29 kg/(m^2).  GENERAL: healthy, alert and no distress  SKIN: small puncture wound with mild erythema  PSYCH: mentation appears normal, affect normal/bright    Diagnostic Test Results:  none     ASSESSMENT/PLAN:     1. Bug bite, initial encounter  Unlikely to be tick, as he never saw anything attached, but we will check lab work just to be sure.  Follow-up as needed.  - Lyme Disease Anju with reflex to WB Serum        Marizol Bell MD  [facility] ([facility])

## 2018-06-23 ASSESSMENT — ANXIETY QUESTIONNAIRES: GAD7 TOTAL SCORE: 0

## 2018-06-25 LAB — B BURGDOR IGG+IGM SER QL: 0.05 (ref 0–0.89)

## 2019-01-30 NOTE — PROGRESS NOTES
SUBJECTIVE:   Napoleon Leroy is a 74 year old male who presents to clinic today for the following health issues:            Corn on Left 4 th toe      Duration: 1.5 months    Description (location/character/radiation): left 4th toe    Intensity:  Severe with shoes on    Accompanying signs and symptoms: none    History (similar episodes/previous evaluation): None    Precipitating or alleviating factors: bare feet alleviate.    Therapies tried and outcome: epsom salt soaks, bacitracin         Problem list and histories reviewed & adjusted, as indicated.  Additional history: as documented    Patient Active Problem List   Diagnosis     Advanced care planning/counseling discussion     Hyperlipidemia     Osteoarthritis of shoulder     Past Surgical History:   Procedure Laterality Date     casting      fracture-arm, RT     COLONOSCOPY  2003     COLONOSCOPY N/A 3/20/2015    Procedure: COLONOSCOPY;  Surgeon: Julio Hinds DO;  Location: [facility]     inguinal hernia repair       ORTHOPEDIC SURGERY Left 4-    Total shoulder arthoscopy     TONSILLECTOMY         Social History     Tobacco Use     Smoking status: Never Smoker     Smokeless tobacco: Never Used     Tobacco comment: no passive exposure   Substance Use Topics     Alcohol use: No     Family History   Problem Relation Age of Onset     Heart Disease Father 48        disease, cause of death     Myocardial Infarction Mother 65        myocardial infarction, cause of death     Cancer Sister 32        hodgkin's disease, cause of death         Current Outpatient Medications   Medication Sig Dispense Refill     Ascorbic Acid (VITAMIN C PO) Take 500 mg by mouth daily       aspirin 81 MG tablet Take by mouth daily       Cholecalciferol (VITAMIN D3 PO) Take by mouth daily       fish oil-omega-3 fatty acids (FISH OIL) 1000 MG capsule Take 1 g by mouth daily       GARLIC PO 0.5 CLOVE DAILY       IBUPROFEN PO Take 200 mg by mouth Takes 2 tabs as needed       Multiple  "Vitamin (DAILY MULTIVITAMIN PO) Take by mouth daily       simvastatin (ZOCOR) 40 MG tablet Take 1 tablet (40 mg) by mouth At Bedtime 90 tablet 3     SUMAtriptan (IMITREX) 100 MG tablet Take 1 tablet (100 mg) by mouth at onset of headache for migraine May repeat in 2 hours. Max 2 tablets/24 hours. 9 tablet 1     UNABLE TO FIND daily cinnamine       zinc sulfate (ZINCATE) 220 MG capsule Take 220 mg by mouth daily       No Known Allergies  Recent Labs   Lab Test 06/13/18  1050 06/13/17  1116 04/21/16  1047   * 87 113*   HDL 59 65 63   TRIG 91 71 95   ALT 21 27 29   TSH  --  0.95  --       BP Readings from Last 3 Encounters:   02/01/19 110/62   06/22/18 100/60   06/13/18 114/72    Wt Readings from Last 3 Encounters:   02/01/19 96.6 kg (213 lb)   06/22/18 93 kg (205 lb)   06/13/18 97.1 kg (214 lb)                  Labs reviewed in EPIC    Reviewed and updated as needed this visit by clinical staff       Reviewed and updated as needed this visit by Provider         ROS:  Constitutional, HEENT, cardiovascular, pulmonary, gi and gu systems are negative, except as otherwise noted.    OBJECTIVE:     /62 (BP Location: Right arm, Patient Position: Sitting, Cuff Size: Adult Large)   Pulse 76   Temp 97.5  F (36.4  C) (Tympanic)   Resp 14   Ht 1.791 m (5' 10.5\")   Wt 96.6 kg (213 lb)   SpO2 94%   BMI 30.13 kg/m     Body mass index is 30.13 kg/m .     GENERAL: healthy, alert and no distress  EYES: Eyes grossly normal to inspection, PERRL and conjunctivae and sclerae normal  CV: regular rate and rhythm, normal S1 S2, no S3 or S4, no murmur, click or rub, no peripheral edema and peripheral pulses strong  MS: Left foot - 4th toe - 1 cm circular scabbed over corn at IP joint.  No redness, no drainage.  Area is tender to touch.     He sees Dr. Hummel in about 2 weeks, we can try to move his appointment up.      ASSESSMENT/PLAN:     1. Corn or callus  - Podiatry appointment as scheduled.   - Follow-up as " needed      Maryjane Lazo NP  [facility]

## 2019-02-01 ENCOUNTER — OFFICE VISIT (OUTPATIENT)
Dept: FAMILY MEDICINE | Facility: OTHER | Age: 74
End: 2019-02-01
Attending: NURSE PRACTITIONER
Payer: MEDICARE

## 2019-02-01 VITALS
DIASTOLIC BLOOD PRESSURE: 62 MMHG | SYSTOLIC BLOOD PRESSURE: 110 MMHG | BODY MASS INDEX: 29.82 KG/M2 | RESPIRATION RATE: 14 BRPM | TEMPERATURE: 97.5 F | OXYGEN SATURATION: 94 % | WEIGHT: 213 LBS | HEART RATE: 76 BPM | HEIGHT: 71 IN

## 2019-02-01 DIAGNOSIS — L84 CORN OR CALLUS: Primary | ICD-10-CM

## 2019-02-01 PROCEDURE — 99213 OFFICE O/P EST LOW 20 MIN: CPT | Performed by: NURSE PRACTITIONER

## 2019-02-01 PROCEDURE — G0463 HOSPITAL OUTPT CLINIC VISIT: HCPCS

## 2019-02-01 ASSESSMENT — MIFFLIN-ST. JEOR: SCORE: 1730.35

## 2019-02-01 ASSESSMENT — PAIN SCALES - GENERAL: PAINLEVEL: SEVERE PAIN (7)

## 2019-02-01 NOTE — NURSING NOTE
"Chief Complaint   Patient presents with     Derm Problem     Corn on foot       Initial /62 (BP Location: Right arm, Patient Position: Sitting, Cuff Size: Adult Large)   Pulse 76   Temp 97.5  F (36.4  C) (Tympanic)   Resp 14   Ht 1.791 m (5' 10.5\")   Wt 96.6 kg (213 lb)   SpO2 94%   BMI 30.13 kg/m   Estimated body mass index is 30.13 kg/m  as calculated from the following:    Height as of this encounter: 1.791 m (5' 10.5\").    Weight as of this encounter: 96.6 kg (213 lb).  Medication Reconciliation: complete    Luisa Lo LPN    "

## 2019-02-01 NOTE — PATIENT INSTRUCTIONS
ASSESSMENT/PLAN:     1. Corn or callus  - Podiatry appointment as scheduled.   - Follow-up as needed      Maryjane Lazo NP  [facility]

## 2019-07-01 ENCOUNTER — OFFICE VISIT (OUTPATIENT)
Dept: FAMILY MEDICINE | Facility: OTHER | Age: 74
End: 2019-07-01
Attending: FAMILY MEDICINE
Payer: MEDICARE

## 2019-07-01 VITALS
SYSTOLIC BLOOD PRESSURE: 114 MMHG | OXYGEN SATURATION: 99 % | HEIGHT: 71 IN | BODY MASS INDEX: 26.81 KG/M2 | DIASTOLIC BLOOD PRESSURE: 64 MMHG | TEMPERATURE: 97.6 F | RESPIRATION RATE: 14 BRPM | WEIGHT: 191.5 LBS | HEART RATE: 68 BPM

## 2019-07-01 DIAGNOSIS — G89.29 CHRONIC LEFT SHOULDER PAIN: ICD-10-CM

## 2019-07-01 DIAGNOSIS — E78.5 HYPERLIPIDEMIA, UNSPECIFIED HYPERLIPIDEMIA TYPE: Primary | ICD-10-CM

## 2019-07-01 DIAGNOSIS — M25.512 CHRONIC LEFT SHOULDER PAIN: ICD-10-CM

## 2019-07-01 DIAGNOSIS — Z23 NEED FOR VACCINATION: ICD-10-CM

## 2019-07-01 DIAGNOSIS — Z96.612 STATUS POST REPLACEMENT OF LEFT SHOULDER JOINT: ICD-10-CM

## 2019-07-01 DIAGNOSIS — L84 CORN OR CALLUS: ICD-10-CM

## 2019-07-01 PROCEDURE — G0463 HOSPITAL OUTPT CLINIC VISIT: HCPCS

## 2019-07-01 PROCEDURE — 90670 PCV13 VACCINE IM: CPT

## 2019-07-01 PROCEDURE — G0009 ADMIN PNEUMOCOCCAL VACCINE: HCPCS | Performed by: FAMILY MEDICINE

## 2019-07-01 PROCEDURE — 36415 COLL VENOUS BLD VENIPUNCTURE: CPT | Mod: ZL | Performed by: FAMILY MEDICINE

## 2019-07-01 PROCEDURE — G0463 HOSPITAL OUTPT CLINIC VISIT: HCPCS | Mod: 25

## 2019-07-01 PROCEDURE — 80061 LIPID PANEL: CPT | Mod: ZL | Performed by: FAMILY MEDICINE

## 2019-07-01 PROCEDURE — 84460 ALANINE AMINO (ALT) (SGPT): CPT | Mod: ZL | Performed by: FAMILY MEDICINE

## 2019-07-01 PROCEDURE — 99214 OFFICE O/P EST MOD 30 MIN: CPT | Performed by: FAMILY MEDICINE

## 2019-07-01 ASSESSMENT — MIFFLIN-ST. JEOR: SCORE: 1632.83

## 2019-07-01 ASSESSMENT — PAIN SCALES - GENERAL: PAINLEVEL: MODERATE PAIN (5)

## 2019-07-01 NOTE — NURSING NOTE
"Chief Complaint   Patient presents with     Hyperlipidemia     Shoulder Pain       Initial /64 (BP Location: Right arm, Patient Position: Sitting, Cuff Size: Adult Large)   Pulse 68   Temp 97.6  F (36.4  C) (Tympanic)   Resp 14   Ht 1.791 m (5' 10.5\")   Wt 86.9 kg (191 lb 8 oz)   SpO2 99%   BMI 27.09 kg/m   Estimated body mass index is 27.09 kg/m  as calculated from the following:    Height as of this encounter: 1.791 m (5' 10.5\").    Weight as of this encounter: 86.9 kg (191 lb 8 oz).  Medication Reconciliation: complete   Luisa Lo      "

## 2019-07-01 NOTE — PROGRESS NOTES
"  SUBJECTIVE:   Napoleon Leroy is a 74 year old male who presents to clinic today for the following   health issues:      Hyperlipidemia Follow-Up      Are you having any of the following symptoms? (Select all that apply)  No complaints of shortness of breath, chest pain or pressure.  No increased sweating or nausea with activity.  No left-sided neck or arm pain.  No complaints of pain in calves when walking 1-2 blocks.    Are you regularly taking any medication or supplement to lower your cholesterol?   Yes- zocor Are you having muscle aches or other side effects that you think could be caused by your cholesterol lowering medication?  No        Amount of exercise or physical activity: 5 days a week    Problems taking medications regularly: No    Medication side effects: none    Diet: regular (no restrictions)        Musculoskeletal problem/pain      Duration: 1 month    Description  Location: left shoulder    Intensity:  moderate    Accompanying signs and symptoms: none    History  Previous similar problem: YES  Previous evaluation:  orthopedic evaluation    Precipitating or alleviating factors:  Trauma or overuse: no   Aggravating factors include: lifting    Therapies tried and outcome: nothing      \"Corn\" of Foot      Duration: months    Description (location/character/radiation): hard area of skin on foot    Intensity:  moderate    Accompanying signs and symptoms: mild discomfort    History (similar episodes/previous evaluation): None    Precipitating or alleviating factors: None    Therapies tried and outcome: None        Additional history: as documented    Reviewed  and updated as needed this visit by clinical staff         Reviewed and updated as needed this visit by Provider         Patient Active Problem List   Diagnosis     Advanced care planning/counseling discussion     Hyperlipidemia     Osteoarthritis of shoulder     Past Surgical History:   Procedure Laterality Date     casting      fracture-arm, " "RT     COLONOSCOPY  2003     COLONOSCOPY N/A 3/20/2015    Procedure: COLONOSCOPY;  Surgeon: Julio Hinds DO;  Location: [facility]     inguinal hernia repair       ORTHOPEDIC SURGERY Left 4-    Total shoulder arthoscopy     TONSILLECTOMY         Social History     Tobacco Use     Smoking status: Never Smoker     Smokeless tobacco: Never Used     Tobacco comment: no passive exposure   Substance Use Topics     Alcohol use: No     Family History   Problem Relation Age of Onset     Heart Disease Father 48        disease, cause of death     Myocardial Infarction Mother 65        myocardial infarction, cause of death     Cancer Sister 32        hodgkin's disease, cause of death         Current Outpatient Medications   Medication Sig Dispense Refill     Ascorbic Acid (VITAMIN C PO) Take 500 mg by mouth daily       aspirin 81 MG tablet Take by mouth daily       Cholecalciferol (VITAMIN D3 PO) Take by mouth daily       fish oil-omega-3 fatty acids (FISH OIL) 1000 MG capsule Take 1 g by mouth daily       GARLIC PO 0.5 CLOVE DAILY       IBUPROFEN PO Take 200 mg by mouth Takes 2 tabs as needed       Multiple Vitamin (DAILY MULTIVITAMIN PO) Take by mouth daily       simvastatin (ZOCOR) 40 MG tablet TAKE 1 TABLET AT BEDTIME 90 tablet 0     SUMAtriptan (IMITREX) 100 MG tablet Take 1 tablet (100 mg) by mouth at onset of headache for migraine May repeat in 2 hours. Max 2 tablets/24 hours. 9 tablet 1     UNABLE TO FIND daily cinnamine       zinc sulfate (ZINCATE) 220 MG capsule Take 220 mg by mouth daily       No Known Allergies    ROS:  Constitutional, HEENT, cardiovascular, pulmonary, gi and gu systems are negative, except as otherwise noted.    OBJECTIVE:                                                    /64 (BP Location: Right arm, Patient Position: Sitting, Cuff Size: Adult Large)   Pulse 68   Temp 97.6  F (36.4  C) (Tympanic)   Resp 14   Ht 1.791 m (5' 10.5\")   Wt 86.9 kg (191 lb 8 oz)   SpO2 99%   BMI " 27.09 kg/m    Body mass index is 27.09 kg/m .  GENERAL APPEARANCE: healthy, alert and no distress  MS: limited ROM at shoulder  SKIN: callous of foot  PSYCH: mentation appears normal and affect normal/bright         ASSESSMENT/PLAN:                                                    1. Hyperlipidemia, unspecified hyperlipidemia type  Labs updated.  No changes at this time, timing to stop simvastatin discussed.  - Lipid Profile  - ALT    2. Chronic left shoulder pain  Referral ordered.  - ORTHOPEDICS ADULT REFERRAL    3. Status post replacement of left shoulder joint  Referral ordered.  - ORTHOPEDICS ADULT REFERRAL    4. [place] or callus  Referral ordered.  - PODIATRY/FOOT & ANKLE SURGERY REFERRAL    5. Need for vaccination  Updated.  - Pneumococcal vaccine 13 valent PCV13 IM (Prevnar) [73373]  - ADMIN: Vaccine, Initial (54431)          Marizol Bell MD  [facility]

## 2019-07-02 LAB
ALT SERPL W P-5'-P-CCNC: 22 U/L (ref 0–70)
CHOLEST SERPL-MCNC: 172 MG/DL
HDLC SERPL-MCNC: 68 MG/DL
LDLC SERPL CALC-MCNC: 88 MG/DL
NONHDLC SERPL-MCNC: 104 MG/DL
TRIGL SERPL-MCNC: 80 MG/DL

## 2019-07-17 ENCOUNTER — OFFICE VISIT (OUTPATIENT)
Dept: PODIATRY | Facility: OTHER | Age: 74
End: 2019-07-17
Attending: FAMILY MEDICINE
Payer: MEDICARE

## 2019-07-17 VITALS
HEIGHT: 71 IN | WEIGHT: 191 LBS | TEMPERATURE: 97.6 F | DIASTOLIC BLOOD PRESSURE: 60 MMHG | HEART RATE: 68 BPM | BODY MASS INDEX: 26.74 KG/M2 | OXYGEN SATURATION: 98 % | SYSTOLIC BLOOD PRESSURE: 104 MMHG

## 2019-07-17 DIAGNOSIS — M21.41 PES PLANUS OF BOTH FEET: ICD-10-CM

## 2019-07-17 DIAGNOSIS — M20.41 ACQUIRED HAMMER TOE DEFORMITY OF LESSER TOE OF BOTH FEET: ICD-10-CM

## 2019-07-17 DIAGNOSIS — M79.672 LEFT FOOT PAIN: ICD-10-CM

## 2019-07-17 DIAGNOSIS — M20.11 HALLUX VALGUS (ACQUIRED), RIGHT FOOT: ICD-10-CM

## 2019-07-17 DIAGNOSIS — M20.42 ACQUIRED HAMMER TOE DEFORMITY OF LESSER TOE OF BOTH FEET: ICD-10-CM

## 2019-07-17 DIAGNOSIS — L84 CORN OR CALLUS: Primary | ICD-10-CM

## 2019-07-17 DIAGNOSIS — M20.12 HALLUX VALGUS (ACQUIRED), LEFT FOOT: ICD-10-CM

## 2019-07-17 DIAGNOSIS — M21.42 PES PLANUS OF BOTH FEET: ICD-10-CM

## 2019-07-17 PROCEDURE — 99203 OFFICE O/P NEW LOW 30 MIN: CPT | Mod: 25 | Performed by: PODIATRIST

## 2019-07-17 PROCEDURE — 11055 PARING/CUTG B9 HYPRKER LES 1: CPT | Mod: Q8,GZ | Performed by: PODIATRIST

## 2019-07-17 PROCEDURE — G0463 HOSPITAL OUTPT CLINIC VISIT: HCPCS | Mod: 25

## 2019-07-17 ASSESSMENT — MIFFLIN-ST. JEOR: SCORE: 1630.56

## 2019-07-17 ASSESSMENT — PAIN SCALES - GENERAL: PAINLEVEL: NO PAIN (0)

## 2019-07-17 NOTE — LETTER
"    7/17/2019         RE: Napoleon Leroy  [address] [place] [address]        Dear Colleague,    Thank you for referring your patient, Napoleon Leroy, to the [facility]. Please see a copy of my visit note below.    Chief complaint: Patient presents with:  Consult: callus/corn /St Song referring    History of Present Illness: This 74 year old male is seen at the request of Dr. Limon for evaluation and suggestions of management of a painful corn on the LEFT foot. The whole LEFT foot and ankle is painful. Patient presents in a brace. He complains of a painful corn on the dorsal LEFT 4th PIPJ. It has been painful since around November, 2018. He cannot wear shoes when the callus gets painful. He had the callus taken down this past winter (possibly by Dr. Cindy Hummel) and it felt better. He has toe sleeves that he was given by Dr. Hummel, but they are too hard to put a pad over the toe because his toes are too close together.     Patient also wears an AFO brace for LEFT medial ankle pain. He has been wearing the brace since around April, 2019. The brace greatly decreases his pain. He was told by another specialist that he would need surgery of the ankle if the brace didn't help, but the brace greatly reduces his pain.     He denies burning, tingling and numbness in his feet. No further pedal complaints today.     /60   Pulse 68   Temp 97.6  F (36.4  C) (Tympanic)   Ht 1.791 m (5' 10.5\")   Wt 86.6 kg (191 lb)   SpO2 98%   BMI 27.02 kg/m       Patient Active Problem List   Diagnosis     Advanced care planning/counseling discussion     Hyperlipidemia     Osteoarthritis of shoulder       Past Surgical History:   Procedure Laterality Date     casting      fracture-arm, RT     COLONOSCOPY  2003     COLONOSCOPY N/A 3/20/2015    Procedure: COLONOSCOPY;  Surgeon: Julio Hinds DO;  Location: [facility]     inguinal hernia repair       ORTHOPEDIC SURGERY Left 4-    Total " shoulder arthoscopy     TONSILLECTOMY         Current Outpatient Medications   Medication     Ascorbic Acid (VITAMIN C PO)     aspirin 81 MG tablet     Cholecalciferol (VITAMIN D3 PO)     fish oil-omega-3 fatty acids (FISH OIL) 1000 MG capsule     GARLIC PO     IBUPROFEN PO     Multiple Vitamin (DAILY MULTIVITAMIN PO)     simvastatin (ZOCOR) 40 MG tablet     SUMAtriptan (IMITREX) 100 MG tablet     UNABLE TO FIND     zinc sulfate (ZINCATE) 220 MG capsule     No current facility-administered medications for this visit.         No Known Allergies    Family History   Problem Relation Age of Onset     Heart Disease Father 48        disease, cause of death     Myocardial Infarction Mother 65        myocardial infarction, cause of death     Cancer Sister 32        hodgkin's disease, cause of death       Social History     Socioeconomic History     Marital status:      Spouse name: None     Number of children: None     Years of education: None     Highest education level: None   Occupational History     None   Social Needs     Financial resource strain: None     Food insecurity:     Worry: None     Inability: None     Transportation needs:     Medical: None     Non-medical: None   Tobacco Use     Smoking status: Never Smoker     Smokeless tobacco: Never Used     Tobacco comment: no passive exposure   Substance and Sexual Activity     Alcohol use: No     Drug use: No     Sexual activity: None   Lifestyle     Physical activity:     Days per week: None     Minutes per session: None     Stress: None   Relationships     Social connections:     Talks on phone: None     Gets together: None     Attends Sikhism service: None     Active member of club or organization: None     Attends meetings of clubs or organizations: None     Relationship status: None     Intimate partner violence:     Fear of current or ex partner: None     Emotionally abused: None     Physically abused: None     Forced sexual activity: None   Other  Topics Concern      Service Not Asked     Blood Transfusions Not Asked     Caffeine Concern Yes     Comment: coffee     Occupational Exposure Not Asked     Hobby Hazards Not Asked     Sleep Concern Not Asked     Stress Concern Not Asked     Weight Concern Not Asked     Special Diet Not Asked     Back Care Not Asked     Exercise Not Asked     Bike Helmet Not Asked     Seat Belt Not Asked     Self-Exams Not Asked     Parent/sibling w/ CABG, MI or angioplasty before 65F 55M? Yes   Social History Narrative     None       ROS: 10 point ROS neg other than the symptoms noted above in the HPI.  EXAM  Constitutional: healthy, alert and no distress    Psychiatric: mentation appears normal and affect normal/bright    VASCULAR:  -Dorsalis pedis pulse +2/4 b/l  -Posterior tibial pulse +1/4 b/l  -Capillary refill time < 3 seconds to b/l hallux  NEURO:  -Light touch sensation intact to b/l plantar forefoot  DERM:  -Hyperkeratotic lesion to LEFT dorsal 4th digit overlying an inflamed, mildly erythematous bursa over the digital IPJ  -Hyperkeratotic lesion to RIGHT medial hallux IPJ  -Skin temperature, texture and turgor WNL b/l  -Toenails elongated, thickened, moderately dystrophic and discolored x 10  MSK:  -Pain on palpation to LEFT dorsal 4th digit PIPJ  -Lateral deviation of hallux with medial deviation of 1st metatarsal bilaterally (L>>R)  -Prominent bony prominence to dorsal and medial 1st metatarsal head bilaterally (L>>R)  -DORSIFLEXION contracture to MTPJ 2-5 b/l with flexion contracture to PIPJ of digits 2-5 b/l  -Bowstringing of extensor tendons bilaterally    -Moderate-to-severe decrease in arch height while patient is NWB  -Muscle strength of ankles +5/5 for dorsiflexion, plantarflexion, ABDUction and ADDuction b/l    ============================================================    ASSESSMENT:  (L84) Corn or callus  (primary encounter diagnosis)    (M79.672) Left foot pain    (M20.12) Hallux valgus (acquired),  left foot    (M20.11) Hallux valgus (acquired), right foot    (M20.41,  M20.42) Acquired hammer toe deformity of lesser toe of both feet    (M21.41,  M21.42) Pes planus of both feet      PLAN:  -Patient evaluated and examined. Treatment options discussed with no educational barriers noted.  -Callus pared x 1 to LEFT dorsal 4th digit PIPJ   ---ABN signed  ---Mild bleeding with hemostasis achieved with silver nitrate  ---Patient reminded that the callus will likely return due to the prominent bone rubbing on his shoe    -Educated patient about routine callus care. The patient is encouraged to do a daily epsom salt soak in lukewarm water for 20 minutes. After the soak, the patient should apply a moisturizing cream to the callus and let it soak in for about ten minutes, then take an rosaura board or nail file to the callus. This should be done daily (minimally lotion and callus paring) to keep the callus well pared.    -Patient wanted to review surgical options for his callus and if he should fix his bunion to fix his toe. He currently has zero pain from his bunion despite its severity, and his LEFT 4th digit is a flexible hammertoe. A flexor tenotomy may be attempted in the office prior to a surgical correction since they may straighten the toe enough to prevent such moderate rubbing of the toe on the shoe.  ---An amputation of the 4th digit would worsen the lateral deviation of digits 1-3  ---Patient will consider a flexor tenotomy, but he has had less pain with the toe the last couple of days. He would like to see if his pain worsens again. Strongly encouraged him to keep up with callus care and to wear a toe sleeve. If it feels too tight, he should purchase a larger size from a local store such as [employer].  -Patient in agreement with the above treatment plan and all of patient's questions were answered.      RTC two months  --Patient will call to cancel if pain improves  --Will discuss a flexor tenotomy at  patient's follow-up appointment        Shahnaz Veras DPM    Again, thank you for allowing me to participate in the care of your patient.        Sincerely,        Shahnaz Veras DPM

## 2019-07-17 NOTE — PROGRESS NOTES
"Chief complaint: Patient presents with:  Consult: callus/corn /St Song referring    History of Present Illness: This 74 year old male is seen at the request of Dr. Limon for evaluation and suggestions of management of a painful corn on the LEFT foot. The whole LEFT foot and ankle is painful. Patient presents in a brace. He complains of a painful corn on the dorsal LEFT 4th PIPJ. It has been painful since around November, 2018. He cannot wear shoes when the callus gets painful. He had the callus taken down this past winter (possibly by Dr. Cindy Hummel) and it felt better. He has toe sleeves that he was given by Dr. Hummel, but they are too hard to put a pad over the toe because his toes are too close together.     Patient also wears an AFO brace for LEFT medial ankle pain. He has been wearing the brace since around April, 2019. The brace greatly decreases his pain. He was told by another specialist that he would need surgery of the ankle if the brace didn't help, but the brace greatly reduces his pain.     He denies burning, tingling and numbness in his feet. No further pedal complaints today.     /60   Pulse 68   Temp 97.6  F (36.4  C) (Tympanic)   Ht 1.791 m (5' 10.5\")   Wt 86.6 kg (191 lb)   SpO2 98%   BMI 27.02 kg/m      Patient Active Problem List   Diagnosis     Advanced care planning/counseling discussion     Hyperlipidemia     Osteoarthritis of shoulder       Past Surgical History:   Procedure Laterality Date     casting      fracture-arm, RT     COLONOSCOPY  2003     COLONOSCOPY N/A 3/20/2015    Procedure: COLONOSCOPY;  Surgeon: Julio Hinds DO;  Location: [facility]     inguinal hernia repair       ORTHOPEDIC SURGERY Left 4-    Total shoulder arthoscopy     TONSILLECTOMY         Current Outpatient Medications   Medication     Ascorbic Acid (VITAMIN C PO)     aspirin 81 MG tablet     Cholecalciferol (VITAMIN D3 PO)     fish oil-omega-3 fatty acids (FISH OIL) 1000 MG capsule     " GARLIC PO     IBUPROFEN PO     Multiple Vitamin (DAILY MULTIVITAMIN PO)     simvastatin (ZOCOR) 40 MG tablet     SUMAtriptan (IMITREX) 100 MG tablet     UNABLE TO FIND     zinc sulfate (ZINCATE) 220 MG capsule     No current facility-administered medications for this visit.         No Known Allergies    Family History   Problem Relation Age of Onset     Heart Disease Father 48        disease, cause of death     Myocardial Infarction Mother 65        myocardial infarction, cause of death     Cancer Sister 32        hodgkin's disease, cause of death       Social History     Socioeconomic History     Marital status:      Spouse name: None     Number of children: None     Years of education: None     Highest education level: None   Occupational History     None   Social Needs     Financial resource strain: None     Food insecurity:     Worry: None     Inability: None     Transportation needs:     Medical: None     Non-medical: None   Tobacco Use     Smoking status: Never Smoker     Smokeless tobacco: Never Used     Tobacco comment: no passive exposure   Substance and Sexual Activity     Alcohol use: No     Drug use: No     Sexual activity: None   Lifestyle     Physical activity:     Days per week: None     Minutes per session: None     Stress: None   Relationships     Social connections:     Talks on phone: None     Gets together: None     Attends Cheondoism service: None     Active member of club or organization: None     Attends meetings of clubs or organizations: None     Relationship status: None     Intimate partner violence:     Fear of current or ex partner: None     Emotionally abused: None     Physically abused: None     Forced sexual activity: None   Other Topics Concern      Service Not Asked     Blood Transfusions Not Asked     Caffeine Concern Yes     Comment: coffee     Occupational Exposure Not Asked     Hobby Hazards Not Asked     Sleep Concern Not Asked     Stress Concern Not Asked      Weight Concern Not Asked     Special Diet Not Asked     Back Care Not Asked     Exercise Not Asked     Bike Helmet Not Asked     Seat Belt Not Asked     Self-Exams Not Asked     Parent/sibling w/ CABG, MI or angioplasty before 65F 55M? Yes   Social History Narrative     None       ROS: 10 point ROS neg other than the symptoms noted above in the HPI.  EXAM  Constitutional: healthy, alert and no distress    Psychiatric: mentation appears normal and affect normal/bright    VASCULAR:  -Dorsalis pedis pulse +2/4 b/l  -Posterior tibial pulse +1/4 b/l  -Capillary refill time < 3 seconds to b/l hallux  NEURO:  -Light touch sensation intact to b/l plantar forefoot  DERM:  -Hyperkeratotic lesion to LEFT dorsal 4th digit overlying an inflamed, mildly erythematous bursa over the digital IPJ  -Hyperkeratotic lesion to RIGHT medial hallux IPJ  -Skin temperature, texture and turgor WNL b/l  -Toenails elongated, thickened, moderately dystrophic and discolored x 10  MSK:  -Pain on palpation to LEFT dorsal 4th digit PIPJ  -Lateral deviation of hallux with medial deviation of 1st metatarsal bilaterally (L>>R)  -Prominent bony prominence to dorsal and medial 1st metatarsal head bilaterally (L>>R)  -DORSIFLEXION contracture to MTPJ 2-5 b/l with flexion contracture to PIPJ of digits 2-5 b/l  -Bowstringing of extensor tendons bilaterally    -Moderate-to-severe decrease in arch height while patient is NWB  -Muscle strength of ankles +5/5 for dorsiflexion, plantarflexion, ABDUction and ADDuction b/l    ============================================================    ASSESSMENT:  (L84) Corn or callus  (primary encounter diagnosis)    (M79.672) Left foot pain    (M20.12) Hallux valgus (acquired), left foot    (M20.11) Hallux valgus (acquired), right foot    (M20.41,  M20.42) Acquired hammer toe deformity of lesser toe of both feet    (M21.41,  M21.42) Pes planus of both feet      PLAN:  -Patient evaluated and examined. Treatment options  discussed with no educational barriers noted.  -Callus pared x 1 to LEFT dorsal 4th digit PIPJ   ---ABN signed  ---Mild bleeding with hemostasis achieved with silver nitrate  ---Patient reminded that the callus will likely return due to the prominent bone rubbing on his shoe    -Educated patient about routine callus care. The patient is encouraged to do a daily epsom salt soak in lukewarm water for 20 minutes. After the soak, the patient should apply a moisturizing cream to the callus and let it soak in for about ten minutes, then take an rosaura board or nail file to the callus. This should be done daily (minimally lotion and callus paring) to keep the callus well pared.    -Patient wanted to review surgical options for his callus and if he should fix his bunion to fix his toe. He currently has zero pain from his bunion despite its severity, and his LEFT 4th digit is a flexible hammertoe. A flexor tenotomy may be attempted in the office prior to a surgical correction since they may straighten the toe enough to prevent such moderate rubbing of the toe on the shoe.  ---An amputation of the 4th digit would worsen the lateral deviation of digits 1-3  ---Patient will consider a flexor tenotomy, but he has had less pain with the toe the last couple of days. He would like to see if his pain worsens again. Strongly encouraged him to keep up with callus care and to wear a toe sleeve. If it feels too tight, he should purchase a larger size from a local store such as [employer].  -Patient in agreement with the above treatment plan and all of patient's questions were answered.      RTC two months  --Patient will call to cancel if pain improves  --Will discuss a flexor tenotomy at patient's follow-up appointment        Shahnaz Veras DPM

## 2019-07-17 NOTE — PATIENT INSTRUCTIONS
Calluses on the bottom surface of the foot will continue to come back due to the pressure from the bone on the bottom of your foot. Below are some tips for keeping the callus(es) trimmed which often decreases the pain on the bottom of your foot.    -Callus care: To your left foot 4th toe  ---Do a daily epsom salt soak in lukewarm water for 20 minutes.   ---After the soak, the apply a moisturizing cream (ammonium lactate) to the callus and let it soak in for about ten minutes  ---Next, take an rosaura board or nail file to or pumice stone the callus. This should be done daily (minimally lotion and callus paring) to keep the callus well pared.

## 2019-07-17 NOTE — NURSING NOTE
"Chief Complaint   Patient presents with     Consult     callus/corn /St Song referring       Initial /60   Pulse 68   Temp 97.6  F (36.4  C) (Tympanic)   Ht 1.791 m (5' 10.5\")   Wt 86.6 kg (191 lb)   SpO2 98%   BMI 27.02 kg/m   Estimated body mass index is 27.02 kg/m  as calculated from the following:    Height as of this encounter: 1.791 m (5' 10.5\").    Weight as of this encounter: 86.6 kg (191 lb).  Medication Reconciliation: complete  "

## 2019-08-13 ENCOUNTER — TRANSFERRED RECORDS (OUTPATIENT)
Dept: HEALTH INFORMATION MANAGEMENT | Facility: CLINIC | Age: 74
End: 2019-08-13

## 2019-09-23 DIAGNOSIS — E78.5 HYPERLIPIDEMIA LDL GOAL <100: ICD-10-CM

## 2019-09-26 RX ORDER — SIMVASTATIN 40 MG
TABLET ORAL
Qty: 90 TABLET | Refills: 3 | Status: SHIPPED | OUTPATIENT
Start: 2019-09-26 | End: 2020-11-27

## 2019-09-26 NOTE — TELEPHONE ENCOUNTER
Per office visit on 7-1-19  ASSESSMENT/PLAN:                                                    1. Hyperlipidemia, unspecified hyperlipidemia type  Labs updated.  No changes at this time, timing to stop simvastatin discussed.      Received request from pharmacy for simvastatin. Would you like to continue this? Please advise. Thank you.

## 2020-07-06 NOTE — TELEPHONE ENCOUNTER
CALL BACK AND SPOUSE ANSWERS AND STATES THE BOTTLE ON ZOCOR STATES 90 WITH 1 REFILL AND THE PAPERWORK STATES 90 WITH 3 REFILLS, COMPUTER SHOWS 90 WITH 3 REFILLS FOR THE FULL YEAR, SHE STATES UNDERSTANDING   (3) no apparent problem

## 2020-11-27 ENCOUNTER — OFFICE VISIT (OUTPATIENT)
Dept: FAMILY MEDICINE | Facility: OTHER | Age: 75
End: 2020-11-27
Attending: FAMILY MEDICINE
Payer: MEDICARE

## 2020-11-27 VITALS
OXYGEN SATURATION: 98 % | HEIGHT: 70 IN | DIASTOLIC BLOOD PRESSURE: 70 MMHG | BODY MASS INDEX: 29.92 KG/M2 | SYSTOLIC BLOOD PRESSURE: 126 MMHG | WEIGHT: 209 LBS | TEMPERATURE: 98 F | HEART RATE: 66 BPM

## 2020-11-27 DIAGNOSIS — E78.5 HYPERLIPIDEMIA, UNSPECIFIED HYPERLIPIDEMIA TYPE: ICD-10-CM

## 2020-11-27 DIAGNOSIS — R51.9 NONINTRACTABLE EPISODIC HEADACHE, UNSPECIFIED HEADACHE TYPE: ICD-10-CM

## 2020-11-27 DIAGNOSIS — E78.5 HYPERLIPIDEMIA LDL GOAL <100: ICD-10-CM

## 2020-11-27 DIAGNOSIS — D22.9 CHANGE IN MOLE: Primary | ICD-10-CM

## 2020-11-27 PROBLEM — C44.320 SCC (SQUAMOUS CELL CARCINOMA), FACE: Status: ACTIVE | Noted: 2020-11-27

## 2020-11-27 LAB
ALT SERPL W P-5'-P-CCNC: 26 U/L (ref 0–70)
CHOLEST SERPL-MCNC: 201 MG/DL
HDLC SERPL-MCNC: 64 MG/DL
LDLC SERPL CALC-MCNC: 118 MG/DL
NONHDLC SERPL-MCNC: 137 MG/DL
TRIGL SERPL-MCNC: 96 MG/DL

## 2020-11-27 PROCEDURE — G0463 HOSPITAL OUTPT CLINIC VISIT: HCPCS

## 2020-11-27 PROCEDURE — 80061 LIPID PANEL: CPT | Mod: ZL | Performed by: FAMILY MEDICINE

## 2020-11-27 PROCEDURE — 36415 COLL VENOUS BLD VENIPUNCTURE: CPT | Mod: ZL | Performed by: FAMILY MEDICINE

## 2020-11-27 PROCEDURE — 99213 OFFICE O/P EST LOW 20 MIN: CPT | Performed by: FAMILY MEDICINE

## 2020-11-27 PROCEDURE — 84460 ALANINE AMINO (ALT) (SGPT): CPT | Mod: ZL | Performed by: FAMILY MEDICINE

## 2020-11-27 RX ORDER — SIMVASTATIN 40 MG
40 TABLET ORAL AT BEDTIME
Qty: 90 TABLET | Refills: 3 | Status: SHIPPED | OUTPATIENT
Start: 2020-11-27 | End: 2020-12-01

## 2020-11-27 RX ORDER — SUMATRIPTAN 100 MG/1
100 TABLET, FILM COATED ORAL
Qty: 9 TABLET | Refills: 1 | Status: SHIPPED | OUTPATIENT
Start: 2020-11-27 | End: 2020-12-01

## 2020-11-27 ASSESSMENT — MIFFLIN-ST. JEOR: SCORE: 1699.27

## 2020-11-27 ASSESSMENT — PAIN SCALES - GENERAL: PAINLEVEL: NO PAIN (0)

## 2020-11-27 NOTE — NURSING NOTE
"Chief Complaint   Patient presents with     Derm Problem       Initial /70 (BP Location: Right arm, Patient Position: Chair, Cuff Size: Adult Regular)   Pulse 66   Temp 98  F (36.7  C) (Temporal)   Ht 1.778 m (5' 10\")   Wt 94.8 kg (209 lb)   SpO2 98%   BMI 29.99 kg/m   Estimated body mass index is 29.99 kg/m  as calculated from the following:    Height as of this encounter: 1.778 m (5' 10\").    Weight as of this encounter: 94.8 kg (209 lb).  Medication Reconciliation: complete  Yu Burgess LPN    "

## 2020-11-27 NOTE — PROGRESS NOTES
Subjective     Napoleon Leroy is a 75 year old male who presents to clinic today for the following health issues:    HPI         Skin Lesion  Onset/Duration: couple months  Description  Location: left elbow  Color: grey/white in color  Border description: raised round  Character: round  Itching: no  Bleeding:  no  Intensity:  mild  Progression of Symptoms:  same  Accompanying signs and symptoms:   Bleeding: no  Scaling: YES  Excessive sun exposure/tanning: no  Sunscreen used: no  History:           Any previous history of skin cancer: YES  Any family history of melanoma: no  Previous episodes of similar lesion: no  Precipitating or alleviating factors: none  Therapies tried and outcome: none  Patient does have a history of SCC on nose, he is worried this is another skin cancer.  He states the lesion first popped up about a month ago.    Hyperlipidemia Follow-Up      Are you regularly taking any medication or supplement to lower your cholesterol?   Yes- simvastatin QOD    Are you having muscle aches or other side effects that you think could be caused by your cholesterol lowering medication?  No        Patient Active Problem List   Diagnosis     Advanced care planning/counseling discussion     Hyperlipidemia     Osteoarthritis of shoulder     SCC (squamous cell carcinoma), face     Past Surgical History:   Procedure Laterality Date     casting      fracture-arm, RT     COLONOSCOPY  2003     COLONOSCOPY N/A 3/20/2015    Procedure: COLONOSCOPY;  Surgeon: Julio Hinds DO;  Location: [facility]     inguinal hernia repair       ORTHOPEDIC SURGERY Left 4-    Total shoulder arthoscopy     TONSILLECTOMY         Social History     Tobacco Use     Smoking status: Never Smoker     Smokeless tobacco: Never Used     Tobacco comment: no passive exposure   Substance Use Topics     Alcohol use: No     Family History   Problem Relation Age of Onset     Heart Disease Father 48        disease, cause of death      "Myocardial Infarction Mother 65        myocardial infarction, cause of death     Cancer Sister 32        hodgkin's disease, cause of death           Current Outpatient Medications   Medication Sig Dispense Refill     Ascorbic Acid (VITAMIN C PO) Take 500 mg by mouth daily       aspirin 81 MG tablet Take by mouth daily       Cholecalciferol (VITAMIN D3 PO) Take by mouth daily       fish oil-omega-3 fatty acids (FISH OIL) 1000 MG capsule Take 1 g by mouth daily       GARLIC PO 0.5 CLOVE DAILY       IBUPROFEN PO Take 200 mg by mouth Takes 2 tabs as needed       Multiple Vitamin (DAILY MULTIVITAMIN PO) Take by mouth daily       simvastatin (ZOCOR) 40 MG tablet TAKE 1 TABLET AT BEDTIME 90 tablet 3     SUMAtriptan (IMITREX) 100 MG tablet Take 1 tablet (100 mg) by mouth at onset of headache for migraine May repeat in 2 hours. Max 2 tablets/24 hours. 9 tablet 1     UNABLE TO FIND daily cinnamine       zinc sulfate (ZINCATE) 220 MG capsule Take 220 mg by mouth daily       No Known Allergies    Family History, Social History, Tobacco Use are all reviewed and updated      Review of Systems   Constitutional, HEENT, cardiovascular, pulmonary, gi and gu systems are negative, except as otherwise noted.      Objective    /70 (BP Location: Right arm, Patient Position: Chair, Cuff Size: Adult Regular)   Pulse 66   Temp 98  F (36.7  C) (Temporal)   Ht 1.778 m (5' 10\")   Wt 94.8 kg (209 lb)   SpO2 98%   BMI 29.99 kg/m    Body mass index is 29.99 kg/m .  Physical Exam   GENERAL: healthy, alert and no distress  SKIN: whitish rough papule on skin near left elbow, well demarcated borders, less than 6 mm size  PSYCH: mentation appears normal, affect normal/bright          Assessment & Plan     1. Change in mole  Referral to surgery for excision.  Follow-up as directed.  - GENERAL SURG ADULT REFERRAL    2. Hyperlipidemia, unspecified hyperlipidemia type  Labs updated.  - Lipid Profile (Chol, Trig, HDL, LDL calc)  - ALT      BMI: " "  Estimated body mass index is 29.99 kg/m  as calculated from the following:    Height as of this encounter: 1.778 m (5' 10\").    Weight as of this encounter: 94.8 kg (209 lb).          Return in about 6 months (around 5/27/2021) for Chronic Disease Management, Medication review.    Marizol Bell MD  [facility] [place] [facility]    "

## 2020-12-01 DIAGNOSIS — E78.5 HYPERLIPIDEMIA LDL GOAL <100: ICD-10-CM

## 2020-12-01 RX ORDER — SIMVASTATIN 40 MG
40 TABLET ORAL AT BEDTIME
Qty: 90 TABLET | Refills: 3 | Status: SHIPPED | OUTPATIENT
Start: 2020-12-01 | End: 2022-01-13

## 2020-12-01 NOTE — TELEPHONE ENCOUNTER
Call from pt reporting Rx refill was sent to the wrong pharmacy.     Rx was to be sent to [facility] and it was sent to [facility].     New RX pended and sent to [facility] per Dr. Limon's previous approval.

## 2020-12-02 ENCOUNTER — OFFICE VISIT (OUTPATIENT)
Dept: SURGERY | Facility: OTHER | Age: 75
End: 2020-12-02
Attending: SURGERY
Payer: MEDICARE

## 2020-12-02 VITALS
WEIGHT: 209 LBS | OXYGEN SATURATION: 95 % | DIASTOLIC BLOOD PRESSURE: 82 MMHG | SYSTOLIC BLOOD PRESSURE: 148 MMHG | BODY MASS INDEX: 29.92 KG/M2 | TEMPERATURE: 98.9 F | HEART RATE: 72 BPM | HEIGHT: 70 IN

## 2020-12-02 DIAGNOSIS — L98.9 SKIN LESION: Primary | ICD-10-CM

## 2020-12-02 PROCEDURE — G0463 HOSPITAL OUTPT CLINIC VISIT: HCPCS | Mod: 25

## 2020-12-02 PROCEDURE — 88305 TISSUE EXAM BY PATHOLOGIST: CPT | Mod: TC | Performed by: SURGERY

## 2020-12-02 PROCEDURE — 11400 EXC TR-EXT B9+MARG 0.5 CM<: CPT | Performed by: SURGERY

## 2020-12-02 PROCEDURE — 99203 OFFICE O/P NEW LOW 30 MIN: CPT | Mod: 25 | Performed by: SURGERY

## 2020-12-02 PROCEDURE — 11400 EXC TR-EXT B9+MARG 0.5 CM<: CPT

## 2020-12-02 ASSESSMENT — PAIN SCALES - GENERAL: PAINLEVEL: NO PAIN (0)

## 2020-12-02 ASSESSMENT — MIFFLIN-ST. JEOR: SCORE: 1699.27

## 2020-12-02 NOTE — PROGRESS NOTES
CLINIC NOTE - CONSULT  12/2/2020    Patient:Napoleon Leroy    Referring Physician: No ref. provider found    Reason for Referral: Lesion on left elbow    This is a 75 year old male with a lesion on the left elbow.      They have noticed if for several years  The lesion is getting larger.    The lesion is changing color.    The patient does not have a history of Melanoma.    The patient does desire excision.     Past Medical History:  Past Medical History:   Diagnosis Date     Osteoarthritis of left shoulder 06/14/2012     Other and unspecified hyperlipidemia 02/08/2002       Past Surgical History:  Past Surgical History:   Procedure Laterality Date     casting      fracture-arm, RT     COLONOSCOPY  2003     COLONOSCOPY N/A 3/20/2015    Procedure: COLONOSCOPY;  Surgeon: Julio Hinds DO;  Location: [facility]     inguinal hernia repair       ORTHOPEDIC SURGERY Left 4-    Total shoulder arthoscopy     TONSILLECTOMY         Family History History:  Family History   Problem Relation Age of Onset     Heart Disease Father 48        disease, cause of death     Myocardial Infarction Mother 65        myocardial infarction, cause of death     Cancer Sister 32        hodgkin's disease, cause of death       History of Tobacco Use:  History   Smoking Status     Never Smoker   Smokeless Tobacco     Never Used     Comment: no passive exposure       Current Medications:  Current Outpatient Medications   Medication Sig Dispense Refill     Ascorbic Acid (VITAMIN C PO) Take 500 mg by mouth daily       aspirin 81 MG tablet Take by mouth daily       Cholecalciferol (VITAMIN D3 PO) Take by mouth daily       fish oil-omega-3 fatty acids (FISH OIL) 1000 MG capsule Take 1 g by mouth daily       GARLIC PO 0.5 CLOVE DAILY       IBUPROFEN PO Take 200 mg by mouth Takes 2 tabs as needed       Multiple Vitamin (DAILY MULTIVITAMIN PO) Take by mouth daily       simvastatin (ZOCOR) 40 MG tablet Take 1 tablet (40 mg) by mouth At Bedtime 90  "tablet 3     UNABLE TO FIND daily cinnamine       zinc sulfate (ZINCATE) 220 MG capsule Take 220 mg by mouth daily         Allergies:  No Known Allergies    ROS:  Pertinent items are noted in HPI.  All other systems are negative.    PHYSICAL EXAM:     Vital signs: BP (!) 148/82 (BP Location: Left arm, Cuff Size: Adult Regular)   Pulse 72   Temp 98.9  F (37.2  C) (Tympanic)   Ht 1.778 m (5' 10\")   Wt 94.8 kg (209 lb)   SpO2 95%   BMI 29.99 kg/m     Weight: [unfilled]   BMI: Body mass index is 29.99 kg/m .   General: Normal, healthy, cooperative, in no acute distress   Skin: no jaundice   HEENT: PERRLA and EOMI   Neck: supple   Lungs: clear to auscultation   CV: Regular rate and rhythm without murmer   Abdominal: abdomen is soft without significant tenderness, masses, organomegaly or guarding   Extremities: No cyanosis, clubbing or edema noted bilaterally in Upper and Lower Extremities   Neurological: without deficit     On his left elbow there is a 4 mm x 4 mm lesion with some slight pigmentation.  No surrounding erythema.    ASSESSMENT: 75 year old male with a lesion on the left elbow.    PLAN: Discussed options with the patient.  Will plan on taking the patient to the [facility] for surgical excision.      The risks, benefits, and alternatives to the planned procedure were fully discussed with the patient and/or the patient's representative(s). The risks of bleeding, infection, death, missing pathology, the need for additional procedures intra-operatively, the possible need for intra-operative consults, the possible need for transfusion therapy, cardiopulmonary compromise, the possible need for additional surgery for a complication were discussed with the patient and/or the patient's representative(s). The patient's and/or patient's representative(s) questions were addressed and answered. Informed consent was obtained from the patient and/or the patient's representative(s). The patient and/or the " patient's representative(s) consent to proceed.    The patient was taken to the minor procedure room and positioned in the supine position.  The lesion was identified and the left elbow was sterilely prepped and draped in the usual fashion.  The area was anesthestized with local infiltrative anesthesia.  The skin was then sharply entered and the dissection was carried down until isolation of the lesion.  The lesion was dissected away from the surrounding tissues and removed in it entirety.  The lesion was sent to pathology for pathological diagnosises.  The lesion size was 4mm x 4mm cm  Hemostatis was assured.  The skin was closed with 4-0 nylon.  Sterile dressings were applied.      Follow up: 10-14 days for suture removal.

## 2020-12-02 NOTE — PATIENT INSTRUCTIONS
"Thank you for allowing Dr. Rock and the surgical team to participate in your care today. Please call with any scheduling questions to our Unit Health Coordinator at [phone] or any other questions to the nurse (Maria C) at [phone].     POST PROCEDURE INSTRUCTIONS    Apply ice to the surgical area to reduce swelling if desired. (no longer than 20 minutes at a time)  Remove your dressing in 24-48 hours.  Wash incision gently with soap and water twice daily or let soapy shower water run over the wound. Do not scrub the wound.  Keep incision clean and dry.   Do not submerge wound in water such as a tub bath or swimming.   Do not do dishes or \"dirty work\" if wound is on hands.   Do not put make-up, deodorant, powders, hairspray, lotions, etc on the incision.  You may apply antibiotic ointment twice daily.  Cover with a clean dressing daily or when wet/soiled  You can use acetaminophen(Tylenol) and Ibuprofen for pain.     If you have any bleeding, cover the wound with clean gauze and hold pressure for 10-15 Minutes. If the bleeding does not stop or is heavy and profuse, call the clinic or go to the Urgent Care/Emergency Department.    SIGNS OF INFECTION:    Watch for redness, swelling, red streaks, pus, drainage, warmth, fever, increased pain, foul smell.   Contact our office or your primary health care provider if you notice any of the warning signs.     FOLLOW - UP    Follow-up in clinic with Kinjal Brady in [facility] [place] in 10-14 days for suture removal.   Pathology results will available in about 10 days and be communicated via phone or letter or may be discussed at a follow-up appointment.  Call the office with any questions.     "

## 2020-12-02 NOTE — NURSING NOTE
"Chief Complaint   Patient presents with     Procedure     punch biopsy       Initial BP (!) 148/82 (BP Location: Left arm, Cuff Size: Adult Regular)   Pulse 72   Temp 98.9  F (37.2  C) (Tympanic)   Ht 1.778 m (5' 10\")   Wt 94.8 kg (209 lb)   SpO2 95%   BMI 29.99 kg/m   Estimated body mass index is 29.99 kg/m  as calculated from the following:    Height as of this encounter: 1.778 m (5' 10\").    Weight as of this encounter: 94.8 kg (209 lb).  Medication Reconciliation: complete  Jesi Villarreal LPN  "

## 2020-12-04 LAB — COPATH REPORT: NORMAL

## 2020-12-15 ENCOUNTER — OFFICE VISIT (OUTPATIENT)
Dept: SURGERY | Facility: OTHER | Age: 75
End: 2020-12-15
Attending: NURSE PRACTITIONER
Payer: MEDICARE

## 2020-12-15 VITALS
WEIGHT: 207 LBS | HEART RATE: 64 BPM | SYSTOLIC BLOOD PRESSURE: 120 MMHG | TEMPERATURE: 97.3 F | HEIGHT: 70 IN | BODY MASS INDEX: 29.63 KG/M2 | DIASTOLIC BLOOD PRESSURE: 64 MMHG | OXYGEN SATURATION: 98 %

## 2020-12-15 DIAGNOSIS — Z98.890 STATUS POST EXCISIONAL BIOPSY: Primary | ICD-10-CM

## 2020-12-15 PROCEDURE — 99212 OFFICE O/P EST SF 10 MIN: CPT | Performed by: NURSE PRACTITIONER

## 2020-12-15 PROCEDURE — G0463 HOSPITAL OUTPT CLINIC VISIT: HCPCS

## 2020-12-15 ASSESSMENT — MIFFLIN-ST. JEOR: SCORE: 1690.2

## 2020-12-15 ASSESSMENT — PAIN SCALES - GENERAL: PAINLEVEL: NO PAIN (0)

## 2020-12-15 NOTE — PROGRESS NOTES
"CLINIC NOTE - POST-OP SURGERY  12/15/2020    Patient:Napoleon Leroy    Procedure: excision of left elbow lesion    This is a 75 year old male who is 2 weeks s/p exision of left elbow lesion.  The patient has no complaints today.     Current Medications:  Current Outpatient Medications   Medication Sig Dispense Refill     Ascorbic Acid (VITAMIN C PO) Take 500 mg by mouth daily       aspirin 81 MG tablet Take by mouth daily       Cholecalciferol (VITAMIN D3 PO) Take by mouth daily       fish oil-omega-3 fatty acids (FISH OIL) 1000 MG capsule Take 1 g by mouth daily       GARLIC PO 0.5 CLOVE DAILY       IBUPROFEN PO Take 200 mg by mouth Takes 2 tabs as needed       Multiple Vitamin (DAILY MULTIVITAMIN PO) Take by mouth daily       simvastatin (ZOCOR) 40 MG tablet Take 1 tablet (40 mg) by mouth At Bedtime 90 tablet 3     UNABLE TO FIND daily cinnamine       zinc sulfate (ZINCATE) 220 MG capsule Take 220 mg by mouth daily         Allergies:  No Known Allergies    PHYSICAL EXAM:   Vital signs: /64   Pulse 64   Temp 97.3  F (36.3  C)   Ht 1.778 m (5' 10\")   Wt 93.9 kg (207 lb)   SpO2 98%   BMI 29.70 kg/m     BMI: Body mass index is 29.7 kg/m .   General: Normal, healthy, cooperative, in no acute distress, alert   Lungs: respirations are non-labored   Abdominal: non-distended   Wounds:  Well healed surgical scars consistent with his operation.       PATHOLOGY:  Copath Report   Date Value Ref Range Status   12/02/2020   Final    Patient Name: NAPOLEON LEROY  MR#: [id number]  Specimen #: [id number]  Collected: 12/2/2020  Received: 12/3/2020  Reported: 12/4/2020 14:12  Ordering Phy(s): ETHAN MUSTAFA  Additional Phy(s): SNOW SÁNCHEZ    For improved result formatting, select 'View Enhanced Report Format' under   Linked Documents section.    SPECIMEN(S):  Skin, left elbow    FINAL DIAGNOSIS:  Skin, left elbow, biopsy  - Verrucous keratosis    I have personally reviewed all specimens and/or slides, " including the   listed special stains, and used them  with my medical judgement to determine or confirm the final diagnosis.    Electronically signed out by:    Tom Lutz M.D.    CLINICAL HISTORY:  Skin lesion.    GROSS:  There is a short cylinder of tan skin which is 5 x 4 mm with a granular   tan surface.  After the margin is  inked it is bisected. (2 TE in 1 block). (Dictated by: Tom Lutz MD   12/3/2020 01:31 PM)    MICROSCOPIC:  There is skin with acanthosis and hyperkeratosis.  Don keratosis is   present in some areas but it is not  prominent.  The basement membrane is intact.  There is a small   fibrinopurulent exudate.    CPT Codes  A: 25738-UW9    COLLECTION SITE:  Client: [facility]  Location: [facility] (B)    The technical component of this testing was completed at the [facility], with the  professional component performed at the [facility], [address] [place] [address]  ([phone])           ASSESSMENT:    75 year old male who is 2 weeks s/p excision of left elbow lesion.  Doing well.     PLAN:   No sutures were noted to incisional site at this appointment.     Follow-up as needed

## 2020-12-15 NOTE — NURSING NOTE
"Chief Complaint   Patient presents with     Suture Removal     excision of left elbow lesion 12/2/2020, Dr. Rock       Initial /64   Pulse 64   Temp 97.3  F (36.3  C)   Ht 1.778 m (5' 10\")   Wt 93.9 kg (207 lb)   SpO2 98%   BMI 29.70 kg/m   Estimated body mass index is 29.7 kg/m  as calculated from the following:    Height as of this encounter: 1.778 m (5' 10\").    Weight as of this encounter: 93.9 kg (207 lb).  Medication Reconciliation: complete  ELISEO RICHARDSON LPN    "

## 2020-12-15 NOTE — PATIENT INSTRUCTIONS
Thank you for allowing Kinjal Brady CNP and our surgical team to participate in your care. Please call our health unit coordinator at [phone] with scheduling questions or the nurse at [phone] with any other questions or concerns.

## 2022-01-11 DIAGNOSIS — E78.5 HYPERLIPIDEMIA LDL GOAL <100: ICD-10-CM

## 2022-01-13 RX ORDER — SIMVASTATIN 40 MG
TABLET ORAL
Qty: 90 TABLET | Refills: 3 | Status: SHIPPED | OUTPATIENT
Start: 2022-01-13 | End: 2023-01-17

## 2022-01-13 NOTE — TELEPHONE ENCOUNTER
Zocor      Last Written Prescription Date:  8.30.21  Last Fill Quantity: #90,   # refills: 0  Last Office Visit: 11.27.20  Future Office visit:       Routing refill request to provider for review/approval because:

## 2022-01-27 NOTE — PROGRESS NOTES
"  Assessment & Plan     1. Hyperlipidemia, unspecified hyperlipidemia type  Labs updated, medication recently refilled.  Continue current dose.  We discussed continuing or stopping medication at his age, he states he is tolerating the medication well and will continue it for now.  Follow-up on annual basis.  - Lipid Profile (Chol, Trig, HDL, LDL calc); Future  - ALT; Future  - Glucose; Future  - Lipid Profile (Chol, Trig, HDL, LDL calc)  - ALT  - Glucose       BMI:   Estimated body mass index is 31.51 kg/m  as calculated from the following:    Height as of this encounter: 1.778 m (5' 10\").    Weight as of this encounter: 99.6 kg (219 lb 9.6 oz).     Return in about 1 year (around 1/28/2023) for Chronic Disease Management, Medication review.    Marizol Bell MD  [facility] [place] GEE Bender is a 77 year old who presents for the following health issues     HPI     Hyperlipidemia Follow-Up      Are you regularly taking any medication or supplement to lower your cholesterol?   Yes- Zocor     Are you having muscle aches or other side effects that you think could be caused by your cholesterol lowering medication?  No      How many servings of fruits and vegetables do you eat daily?  4 or more    On average, how many sweetened beverages do you drink each day (Examples: soda, juice, sweet tea, etc.  Do NOT count diet or artificially sweetened beverages)?   0    How many days per week do you exercise enough to make your heart beat faster? 5    How many minutes a day do you exercise enough to make your heart beat faster? 30 - 60    How many days per week do you miss taking your medication? 0      Review of Systems   Constitutional, HEENT, cardiovascular, pulmonary, gi and gu systems are negative, except as otherwise noted.      Objective    /74 (BP Location: Right arm, Patient Position: Chair, Cuff Size: Adult Large)   Pulse 74   Temp (!) 96.6  F (35.9  C) (Tympanic)   Resp 18   Ht " "1.778 m (5' 10\")   Wt 99.6 kg (219 lb 9.6 oz)   SpO2 98%   BMI 31.51 kg/m    Body mass index is 31.51 kg/m .  Physical Exam   GENERAL: healthy, alert and no distress  NECK: no adenopathy and no carotid bruits  RESP: lungs clear to auscultation - no rales, rhonchi or wheezes  CV: regular rates and rhythm, normal S1 S2, no S3 or S4 and no murmur, click or rub  PSYCH: mentation appears normal, affect normal/bright            "

## 2022-01-28 ENCOUNTER — OFFICE VISIT (OUTPATIENT)
Dept: FAMILY MEDICINE | Facility: OTHER | Age: 77
End: 2022-01-28
Attending: FAMILY MEDICINE
Payer: MEDICARE

## 2022-01-28 VITALS
OXYGEN SATURATION: 98 % | HEIGHT: 70 IN | TEMPERATURE: 96.6 F | RESPIRATION RATE: 18 BRPM | BODY MASS INDEX: 31.44 KG/M2 | DIASTOLIC BLOOD PRESSURE: 74 MMHG | WEIGHT: 219.6 LBS | SYSTOLIC BLOOD PRESSURE: 128 MMHG | HEART RATE: 74 BPM

## 2022-01-28 DIAGNOSIS — E78.5 HYPERLIPIDEMIA, UNSPECIFIED HYPERLIPIDEMIA TYPE: Primary | ICD-10-CM

## 2022-01-28 LAB
ALT SERPL W P-5'-P-CCNC: 29 U/L (ref 0–70)
CHOLEST SERPL-MCNC: 166 MG/DL
FASTING STATUS PATIENT QL REPORTED: YES
FASTING STATUS PATIENT QL REPORTED: YES
GLUCOSE BLD-MCNC: 98 MG/DL (ref 70–99)
HDLC SERPL-MCNC: 61 MG/DL
HOLD SPECIMEN: NORMAL
LDLC SERPL CALC-MCNC: 91 MG/DL
NONHDLC SERPL-MCNC: 105 MG/DL
TRIGL SERPL-MCNC: 71 MG/DL

## 2022-01-28 PROCEDURE — 99214 OFFICE O/P EST MOD 30 MIN: CPT | Performed by: FAMILY MEDICINE

## 2022-01-28 PROCEDURE — 80061 LIPID PANEL: CPT | Mod: ZL | Performed by: FAMILY MEDICINE

## 2022-01-28 PROCEDURE — 84460 ALANINE AMINO (ALT) (SGPT): CPT | Mod: ZL | Performed by: FAMILY MEDICINE

## 2022-01-28 PROCEDURE — G0463 HOSPITAL OUTPT CLINIC VISIT: HCPCS

## 2022-01-28 PROCEDURE — 36415 COLL VENOUS BLD VENIPUNCTURE: CPT | Mod: ZL | Performed by: FAMILY MEDICINE

## 2022-01-28 PROCEDURE — 82947 ASSAY GLUCOSE BLOOD QUANT: CPT | Mod: ZL | Performed by: FAMILY MEDICINE

## 2022-01-28 ASSESSMENT — MIFFLIN-ST. JEOR: SCORE: 1737.35

## 2022-01-28 ASSESSMENT — PAIN SCALES - GENERAL: PAINLEVEL: NO PAIN (0)

## 2022-01-28 NOTE — NURSING NOTE
"Chief Complaint   Patient presents with     Lipids       Initial /74 (BP Location: Right arm, Patient Position: Chair, Cuff Size: Adult Large)   Pulse 74   Temp (!) 96.6  F (35.9  C) (Tympanic)   Resp 18   Ht 1.778 m (5' 10\")   Wt 99.6 kg (219 lb 9.6 oz)   SpO2 98%   BMI 31.51 kg/m   Estimated body mass index is 31.51 kg/m  as calculated from the following:    Height as of this encounter: 1.778 m (5' 10\").    Weight as of this encounter: 99.6 kg (219 lb 9.6 oz).  Medication Reconciliation: complete  Pamela M. Lechevalier, LPN    "

## 2022-12-12 ENCOUNTER — OFFICE VISIT (OUTPATIENT)
Dept: FAMILY MEDICINE | Facility: OTHER | Age: 77
End: 2022-12-12
Attending: FAMILY MEDICINE
Payer: MEDICARE

## 2022-12-12 ENCOUNTER — ANCILLARY PROCEDURE (OUTPATIENT)
Dept: GENERAL RADIOLOGY | Facility: OTHER | Age: 77
End: 2022-12-12
Attending: FAMILY MEDICINE
Payer: MEDICARE

## 2022-12-12 VITALS
HEIGHT: 70 IN | TEMPERATURE: 97.9 F | SYSTOLIC BLOOD PRESSURE: 130 MMHG | DIASTOLIC BLOOD PRESSURE: 62 MMHG | OXYGEN SATURATION: 98 % | RESPIRATION RATE: 18 BRPM | WEIGHT: 218 LBS | BODY MASS INDEX: 31.21 KG/M2 | HEART RATE: 71 BPM

## 2022-12-12 DIAGNOSIS — R05.2 SUBACUTE COUGH: Primary | ICD-10-CM

## 2022-12-12 DIAGNOSIS — R05.2 SUBACUTE COUGH: ICD-10-CM

## 2022-12-12 DIAGNOSIS — Z23 NEED FOR PROPHYLACTIC VACCINATION AND INOCULATION AGAINST INFLUENZA: ICD-10-CM

## 2022-12-12 LAB
BASOPHILS # BLD AUTO: 0.1 10E3/UL (ref 0–0.2)
BASOPHILS NFR BLD AUTO: 1 %
EOSINOPHIL # BLD AUTO: 0.1 10E3/UL (ref 0–0.7)
EOSINOPHIL NFR BLD AUTO: 1 %
ERYTHROCYTE [DISTWIDTH] IN BLOOD BY AUTOMATED COUNT: 13 % (ref 10–15)
HCT VFR BLD AUTO: 43.4 % (ref 40–53)
HGB BLD-MCNC: 14.6 G/DL (ref 13.3–17.7)
LYMPHOCYTES # BLD AUTO: 2.1 10E3/UL (ref 0.8–5.3)
LYMPHOCYTES NFR BLD AUTO: 29 %
MCH RBC QN AUTO: 31.5 PG (ref 26.5–33)
MCHC RBC AUTO-ENTMCNC: 33.6 G/DL (ref 31.5–36.5)
MCV RBC AUTO: 94 FL (ref 78–100)
MONOCYTES # BLD AUTO: 1.1 10E3/UL (ref 0–1.3)
MONOCYTES NFR BLD AUTO: 15 %
NEUTROPHILS # BLD AUTO: 3.9 10E3/UL (ref 1.6–8.3)
NEUTROPHILS NFR BLD AUTO: 54 %
PLATELET # BLD AUTO: 255 10E3/UL (ref 150–450)
RBC # BLD AUTO: 4.63 10E6/UL (ref 4.4–5.9)
WBC # BLD AUTO: 7.3 10E3/UL (ref 4–11)

## 2022-12-12 PROCEDURE — 36415 COLL VENOUS BLD VENIPUNCTURE: CPT | Mod: ZL | Performed by: FAMILY MEDICINE

## 2022-12-12 PROCEDURE — G0463 HOSPITAL OUTPT CLINIC VISIT: HCPCS | Mod: 25

## 2022-12-12 PROCEDURE — 99214 OFFICE O/P EST MOD 30 MIN: CPT | Performed by: FAMILY MEDICINE

## 2022-12-12 PROCEDURE — 71046 X-RAY EXAM CHEST 2 VIEWS: CPT | Mod: TC,FY

## 2022-12-12 PROCEDURE — 85004 AUTOMATED DIFF WBC COUNT: CPT | Mod: ZL | Performed by: FAMILY MEDICINE

## 2022-12-12 PROCEDURE — G0008 ADMIN INFLUENZA VIRUS VAC: HCPCS

## 2022-12-12 PROCEDURE — G0463 HOSPITAL OUTPT CLINIC VISIT: HCPCS

## 2022-12-12 RX ORDER — PREDNISONE 20 MG/1
20 TABLET ORAL DAILY
Qty: 5 TABLET | Refills: 0 | Status: SHIPPED | OUTPATIENT
Start: 2022-12-12 | End: 2022-12-17

## 2022-12-12 ASSESSMENT — PAIN SCALES - GENERAL: PAINLEVEL: NO PAIN (0)

## 2022-12-12 NOTE — PROGRESS NOTES
"  Assessment & Plan     1. Subacute cough  No signs of current infection, likely post-viral cough.  Prednisone sent, follow-up if no improvement noted.  - CBC with platelets and differential; Future  - XR CHEST 2 VW (Clinic Performed); Future  - CBC with platelets and differential  - predniSONE (DELTASONE) 20 MG tablet; Take 1 tablet (20 mg) by mouth daily for 5 days  Dispense: 5 tablet; Refill: 0    2. Need for prophylactic vaccination and inoculation against influenza  Updated  - INFLUENZA, QUAD, HIGH DOSE, PF, 65YR + (FLUZONE HD)     Patient does bring up possible \"worms\".  He states he was staying at a relative's house last fall, and after he and his wife left to go home, the relative contacted them to tell them she found small \"worms\" in their bed.  He wonders if he and his wife should be checked.  He denies any symptoms.  We talked about signs/symptoms to monitor for, he will pass this information along to his wife.       BMI:   Estimated body mass index is 31.28 kg/m  as calculated from the following:    Height as of this encounter: 1.778 m (5' 10\").    Weight as of this encounter: 98.9 kg (218 lb).     Return if symptoms worsen or fail to improve.    Marizol Bell MD  [facility] - [place] Bender is a 77 year old presenting for the following health issues:  URI      HPI       COVID-19 Symptom Review  How many days ago did these symptoms start? November 12    Are any of the following symptoms significant for you?    New or worsening difficulty breathing? No    Worsening cough? Yes, it's a dry cough.     Fever or chills? No    Headache: No    Sore throat: YES    Chest pain: No    Diarrhea: No    Body aches? No    What treatments has patient tried? Cough syrup   Does patient live in a nursing home, group home, or shelter? No  Does patient have a way to get food/medications during quarantined? Yes, I have a friend or family member who can help me, but pt is past this " "window    Review of Systems   Constitutional, HEENT, cardiovascular, pulmonary, gi and gu systems are negative, except as otherwise noted.      Objective    /62 (BP Location: Right arm, Patient Position: Sitting, Cuff Size: Adult Regular)   Pulse 71   Temp 97.9  F (36.6  C) (Tympanic)   Resp 18   Ht 1.778 m (5' 10\")   Wt 98.9 kg (218 lb)   SpO2 98%   BMI 31.28 kg/m    Body mass index is 31.28 kg/m .  Physical Exam   GENERAL: alert and no distress  NECK: no adenopathy  RESP: lungs clear to auscultation - no rales, rhonchi or wheezes  CV: regular rates and rhythm, normal S1 S2, no S3 or S4 and no murmur, click or rub  PSYCH: mentation appears normal, affect normal/bright    Results for orders placed or performed in visit on 12/12/22   XR CHEST 2 VW (Clinic Performed)     Status: None    Narrative    Procedure:XR CHEST 2 VIEWS    Clinical history:Male, 77 years, Subacute cough    Technique: Two views are submitted.    Comparison: No relevant prior imaging.    Findings: The cardiac silhouette is normal. The pulmonary vasculature  is normal.    The lungs hyperinflated however appear to be clear. Bony structures  demonstrate postoperative changes in the left shoulder and severe  right shoulder degenerative change.      Impression    Impression:   Hyperinflation of the lungs without evidence of pneumonia, CHF or  other acute abnormality.    ROBY DALY MD         SYSTEM ID:  [id number]   Results for orders placed or performed in visit on 12/12/22   CBC with platelets and differential     Status: None   Result Value Ref Range    WBC Count 7.3 4.0 - 11.0 10e3/uL    RBC Count 4.63 4.40 - 5.90 10e6/uL    Hemoglobin 14.6 13.3 - 17.7 g/dL    Hematocrit 43.4 40.0 - 53.0 %    MCV 94 78 - 100 fL    MCH 31.5 26.5 - 33.0 pg    MCHC 33.6 31.5 - 36.5 g/dL    RDW 13.0 10.0 - 15.0 %    Platelet Count 255 150 - 450 10e3/uL    % Neutrophils 54 %    % Lymphocytes 29 %    % Monocytes 15 %    % Eosinophils 1 %    % Basophils 1 " %    Absolute Neutrophils 3.9 1.6 - 8.3 10e3/uL    Absolute Lymphocytes 2.1 0.8 - 5.3 10e3/uL    Absolute Monocytes 1.1 0.0 - 1.3 10e3/uL    Absolute Eosinophils 0.1 0.0 - 0.7 10e3/uL    Absolute Basophils 0.1 0.0 - 0.2 10e3/uL   CBC with platelets and differential     Status: None    Narrative    The following orders were created for panel order CBC with platelets and differential.  Procedure                               Abnormality         Status                     ---------                               -----------         ------                     CBC with platelets and d...[[id number]]                      Final result                 Please view results for these tests on the individual orders.

## 2023-01-17 DIAGNOSIS — E78.5 HYPERLIPIDEMIA LDL GOAL <100: ICD-10-CM

## 2023-01-17 NOTE — TELEPHONE ENCOUNTER
Zocor     Last Written Prescription Date:  1.13.2022  Last Fill Quantity: 90,   # refills: 3  Last Office Visit: 12.12.2022  Future Office visit:       Routing refill request to provider for review/approval because:  LDL Cholesterol Calculated   Date Value Ref Range Status   01/28/2022 91 <=100 mg/dL Final   11/27/2020 118 (H) <100 mg/dL Final     Comment:     Above desirable:  100-129 mg/dl  Borderline High:  130-159 mg/dL  High:             160-189 mg/dL  Very high:       >189 mg/dl         Rocio Kee RN

## 2023-01-18 RX ORDER — SIMVASTATIN 40 MG
40 TABLET ORAL AT BEDTIME
Qty: 90 TABLET | Refills: 3 | Status: SHIPPED | OUTPATIENT
Start: 2023-01-18 | End: 2023-01-30

## 2023-01-30 DIAGNOSIS — E78.5 HYPERLIPIDEMIA LDL GOAL <100: ICD-10-CM

## 2023-01-30 RX ORDER — SIMVASTATIN 40 MG
40 TABLET ORAL AT BEDTIME
Qty: 90 TABLET | Refills: 3 | Status: SHIPPED | OUTPATIENT
Start: 2023-01-30 | End: 2023-02-03

## 2023-01-30 NOTE — TELEPHONE ENCOUNTER
Pt thinks his pharmacy changed.Called [facility] pharmacy and this is pt new pharmacy. He does need to call to release med.Gave the number to pt. He will call in the AM. Contact number is [phone]      Zocor filled 1.18.2023 #90 with 3.LOV 12.12.2022.    Pended for [facility].      Rocio Kee RN

## 2023-02-03 ENCOUNTER — TELEPHONE (OUTPATIENT)
Dept: FAMILY MEDICINE | Facility: OTHER | Age: 78
End: 2023-02-03

## 2023-02-03 DIAGNOSIS — E78.5 HYPERLIPIDEMIA LDL GOAL <100: ICD-10-CM

## 2023-02-03 RX ORDER — SIMVASTATIN 40 MG
40 TABLET ORAL AT BEDTIME
Qty: 90 TABLET | Refills: 3 | Status: SHIPPED | OUTPATIENT
Start: 2023-02-03 | End: 2024-02-16

## 2023-02-03 NOTE — TELEPHONE ENCOUNTER
Nohelia states simvastatin is backordered @ .  Writer offered to pend to a local pharmacy  Patient in agreement  No further concerns at calls end.

## 2023-06-19 ENCOUNTER — OFFICE VISIT (OUTPATIENT)
Dept: FAMILY MEDICINE | Facility: OTHER | Age: 78
End: 2023-06-19
Attending: NURSE PRACTITIONER
Payer: COMMERCIAL

## 2023-06-19 VITALS
OXYGEN SATURATION: 95 % | DIASTOLIC BLOOD PRESSURE: 82 MMHG | SYSTOLIC BLOOD PRESSURE: 132 MMHG | BODY MASS INDEX: 29.99 KG/M2 | HEART RATE: 86 BPM | WEIGHT: 209 LBS | RESPIRATION RATE: 20 BRPM | TEMPERATURE: 97.6 F

## 2023-06-19 DIAGNOSIS — R09.81 SINUS CONGESTION: ICD-10-CM

## 2023-06-19 DIAGNOSIS — R05.1 ACUTE COUGH: Primary | ICD-10-CM

## 2023-06-19 LAB
ANION GAP SERPL CALCULATED.3IONS-SCNC: 13 MMOL/L (ref 7–15)
BUN SERPL-MCNC: 12.8 MG/DL (ref 8–23)
CALCIUM SERPL-MCNC: 9 MG/DL (ref 8.8–10.2)
CHLORIDE SERPL-SCNC: 102 MMOL/L (ref 98–107)
CREAT SERPL-MCNC: 0.91 MG/DL (ref 0.67–1.17)
DEPRECATED HCO3 PLAS-SCNC: 21 MMOL/L (ref 22–29)
GFR SERPL CREATININE-BSD FRML MDRD: 87 ML/MIN/1.73M2
GLUCOSE SERPL-MCNC: 99 MG/DL (ref 70–99)
POTASSIUM SERPL-SCNC: 4.2 MMOL/L (ref 3.4–5.3)
SARS-COV-2 RNA RESP QL NAA+PROBE: NEGATIVE
SODIUM SERPL-SCNC: 136 MMOL/L (ref 136–145)

## 2023-06-19 PROCEDURE — 99213 OFFICE O/P EST LOW 20 MIN: CPT | Performed by: NURSE PRACTITIONER

## 2023-06-19 PROCEDURE — G0463 HOSPITAL OUTPT CLINIC VISIT: HCPCS

## 2023-06-19 PROCEDURE — 80048 BASIC METABOLIC PNL TOTAL CA: CPT | Mod: ZL | Performed by: NURSE PRACTITIONER

## 2023-06-19 PROCEDURE — 36415 COLL VENOUS BLD VENIPUNCTURE: CPT | Mod: ZL | Performed by: NURSE PRACTITIONER

## 2023-06-19 PROCEDURE — 87635 SARS-COV-2 COVID-19 AMP PRB: CPT | Mod: ZL | Performed by: NURSE PRACTITIONER

## 2023-06-19 RX ORDER — DEXTROMETHORPHAN POLISTIREX 30 MG/5ML
10 SUSPENSION ORAL 2 TIMES DAILY PRN
Qty: 89 ML | Refills: 0 | Status: SHIPPED | OUTPATIENT
Start: 2023-06-19 | End: 2024-03-21

## 2023-06-19 ASSESSMENT — PAIN SCALES - GENERAL: PAINLEVEL: NO PAIN (0)

## 2023-06-19 NOTE — RESULT ENCOUNTER NOTE
Waiting on COVID result.   Creatinine (lab for kidneys) level is normal. CO2 slightly decreased- likely transient

## 2023-06-19 NOTE — PROGRESS NOTES
"  Assessment & Plan     Acute cough  Plan for PAXLOVID if renal function is okay and COVID is positive.   -If negative, recommend Napoleon start on zyrtec every night.  -nasal washes reviewed and recommended. Napoleon has tried and not tolerated these in the past. He will consider a saline spray.   -  dextromethorphan (DELSYM) 30 MG/5ML liquid- Route: Take 10 mLs (60 mg) by mouth 2 times daily as needed for cough - Oral  - Basic metabolic panel; Future  - Symptomatic COVID-19 Virus (Coronavirus) by PCR; Future    Sinus congestion  - Symptomatic COVID-19 Virus (Coronavirus) by PCR; Future    Ordering of each unique test     BMI:   Estimated body mass index is 29.99 kg/m  as calculated from the following:    Height as of 12/12/22: 1.778 m (5' 10\").    Weight as of this encounter: 94.8 kg (209 lb).   Weight management plan: Discussed healthy diet and exercise guidelines      No follow-ups on file.    Angela Arce, CNP  [facility]   Napoleon is a 78 year old, presenting for the following health issues:  Cough         View : No data to display.              HPI     Acute Illness  Acute illness concerns: cough (dry), sinus congestion and drainage (clear in color)   Onset/Duration: x 4-5 days   Symptoms:  Fever: unknown   Chills/Sweats: YES- felt chills yesterday.  Headache (location?): No  Sinus Pressure: YES  Conjunctivitis:  No  Ear Pain: no  Rhinorrhea: YES  Congestion: YES  Sore Throat: No  Cough: YES-non-productive  Wheeze: No  Decreased Appetite: YES  Nausea: No  Vomiting: No  Diarrhea: No  Dysuria/Freq.: No  Dysuria or Hematuria: No  Fatigue/Achiness: YES- decreased energy since this summer. Harder to go up stairs. \"hard for me to get my legs up there\".   Sick/Strep Exposure: No  Therapies tried and outcome: None      Review of Systems   Constitutional, HEENT, cardiovascular, pulmonary, gi and gu systems are negative, except as otherwise noted.      Objective    /82 (BP " Location: Right arm, Patient Position: Sitting, Cuff Size: Adult Regular)   Pulse 86   Temp 97.6  F (36.4  C) (Tympanic)   Resp 20   Wt 94.8 kg (209 lb)   SpO2 95%   BMI 29.99 kg/m    Body mass index is 29.99 kg/m .  Physical Exam   GENERAL: healthy, alert and no distress  EYES: Eyes grossly normal to inspection, PERRL and conjunctivae and sclerae normal  HENT: ear canals and TM's normal, nose and mouth without ulcers. Clear postnasal drainage in large amount.   RESP: non-productive cough.  lungs clear to auscultation - no rales, rhonchi or wheezes  CV: regular rate and rhythm, normal S1 S2, no S3 or S4, no murmur, click or rub, no peripheral edema and peripheral pulses strong    Labs and/or imaging are pending at the end of this clinic visit. Please see communication attached to labs and/or imaging results.

## 2023-06-19 NOTE — PATIENT INSTRUCTIONS
Acute cough  Plan for PAXLOVID if renal function is okay and COVID is positive. If negative, recommend Napoleon start on zyrtec every night.    - Basic metabolic panel; Future  - Symptomatic COVID-19 Virus (Coronavirus) by PCR; Future    Sinus congestion  - Symptomatic COVID-19 Virus (Coronavirus) by PCR; Future

## 2023-09-18 ENCOUNTER — OFFICE VISIT (OUTPATIENT)
Dept: FAMILY MEDICINE | Facility: OTHER | Age: 78
End: 2023-09-18
Attending: NURSE PRACTITIONER
Payer: COMMERCIAL

## 2023-09-18 VITALS — WEIGHT: 212.6 LBS | OXYGEN SATURATION: 96 % | BODY MASS INDEX: 30.43 KG/M2 | HEIGHT: 70 IN

## 2023-09-18 DIAGNOSIS — H81.11 BENIGN PAROXYSMAL POSITIONAL VERTIGO OF RIGHT EAR: Primary | ICD-10-CM

## 2023-09-18 PROCEDURE — 99213 OFFICE O/P EST LOW 20 MIN: CPT | Performed by: FAMILY MEDICINE

## 2023-09-18 PROCEDURE — G0463 HOSPITAL OUTPT CLINIC VISIT: HCPCS

## 2023-09-18 PROCEDURE — 95992 CANALITH REPOSITIONING PROC: CPT | Performed by: FAMILY MEDICINE

## 2023-09-18 RX ORDER — MECLIZINE HYDROCHLORIDE 25 MG/1
25 TABLET ORAL 3 TIMES DAILY PRN
Qty: 30 TABLET | Refills: 0 | Status: SHIPPED | OUTPATIENT
Start: 2023-09-18 | End: 2024-03-21

## 2023-09-18 NOTE — PROGRESS NOTES
"  Assessment & Plan     1. Benign paroxysmal positional vertigo of right ear  PT referral ordered for repositioning, I think with severity of symptoms patient would not tolerate at home.  Meclizine given to help with symptoms as needed.  Follow-up if no improvement noted.  - Physical Therapy Referral; Future  - meclizine (ANTIVERT) 25 MG tablet; Take 1 tablet (25 mg) by mouth 3 times daily as needed for dizziness  Dispense: 30 tablet; Refill: 0  - CANALITH REPOSITIONING, PER DAY     Return if symptoms worsen or fail to improve.    Marizol Bell MD  [facility] - KRISH Bender is a 78 year old, presenting for the following health issues:  Hypertension and Dizziness      HPI     Hypertension Follow-up    Do you check your blood pressure regularly outside of the clinic? No   Are you following a low salt diet? No  Are your blood pressures ever more than 140 on the top number (systolic) OR more   than 90 on the bottom number (diastolic), for example 140/90? Yes-Pt states his neighbor checked it and he was dizzy        Review of Systems   Constitutional, HEENT, cardiovascular, pulmonary, gi and gu systems are negative, except as otherwise noted.      Objective    Ht 1.778 m (5' 10\")   Wt 96.4 kg (212 lb 9.6 oz)   SpO2 96%   BMI 30.50 kg/m    Body mass index is 30.5 kg/m .  Orthostatics are checked, documented in chart, no significant change with change in position  Physical Exam   GENERAL: healthy, alert and no distress  HENT: ear canals and TM's normal, nose and mouth without ulcers or lesions  PSYCH: mentation appears normal, affect normal/bright    Nury-Hallpike maneuver is completed with head turned to the right, patient developed immediate significant vertigo with nystagmus when laying back, resolved after about 45 seconds, mild symptoms with sitting up that persisted for about 5 minutes.  Maneuver is not repeated on the left due to significant uncomfortable symptoms.      "

## 2024-02-16 DIAGNOSIS — E78.5 HYPERLIPIDEMIA LDL GOAL <100: ICD-10-CM

## 2024-02-16 RX ORDER — SIMVASTATIN 40 MG
40 TABLET ORAL AT BEDTIME
Qty: 90 TABLET | Refills: 1 | Status: SHIPPED | OUTPATIENT
Start: 2024-02-16

## 2024-02-16 NOTE — TELEPHONE ENCOUNTER
SIMVASTATIN 40MG TABLETS           Last Written Prescription Date:  2/3/23  Last Fill Quantity: 90,   # refills: 3  Last Office Visit: 9/18/23  Future Office visit:       Routing refill request to provider for review/approval because:    Antihyperlipidemic agents [id number]/16/2024 03:16 AM   Protocol Details Lipid panel on file in past 12 mos    Normal serum ALT on record in past 12 mos     Lab Results   Component Value Date    CHOL 166 01/28/2022    CHOL 201 11/27/2020     Lab Results   Component Value Date    HDL 61 01/28/2022    HDL 64 11/27/2020     Lab Results   Component Value Date    LDL 91 01/28/2022     11/27/2020     Lab Results   Component Value Date    TRIG 71 01/28/2022    TRIG 96 11/27/2020     Lab Results   Component Value Date    CHOLHDLRATIO 2.5 02/13/2015     Lab Results   Component Value Date    ALT 29 01/28/2022    ALT 26 11/27/2020

## 2024-03-21 ENCOUNTER — VIRTUAL VISIT (OUTPATIENT)
Dept: FAMILY MEDICINE | Facility: OTHER | Age: 79
End: 2024-03-21
Attending: NURSE PRACTITIONER
Payer: COMMERCIAL

## 2024-03-21 ENCOUNTER — TELEPHONE (OUTPATIENT)
Dept: FAMILY MEDICINE | Facility: OTHER | Age: 79
End: 2024-03-21

## 2024-03-21 DIAGNOSIS — U07.1 INFECTION DUE TO 2019 NOVEL CORONAVIRUS: Primary | ICD-10-CM

## 2024-03-21 PROBLEM — H81.11 BPPV (BENIGN PAROXYSMAL POSITIONAL VERTIGO), RIGHT: Status: ACTIVE | Noted: 2023-10-10

## 2024-03-21 PROBLEM — R26.89 IMPAIRMENT OF BALANCE: Status: ACTIVE | Noted: 2023-10-10

## 2024-03-21 PROBLEM — H81.11 BPPV (BENIGN PAROXYSMAL POSITIONAL VERTIGO), RIGHT: Status: RESOLVED | Noted: 2023-10-10 | Resolved: 2024-03-21

## 2024-03-21 PROCEDURE — 99442 PR PHYSICIAN TELEPHONE EVALUATION 11-20 MIN: CPT | Mod: 93 | Performed by: NURSE PRACTITIONER

## 2024-03-21 NOTE — TELEPHONE ENCOUNTER
Telephone call placed to patient.  Patient has a home test he is going to use.  States he has completed before and is reading instructions now.  Patient to call back to [facility] Care Team line to report results.

## 2024-03-21 NOTE — PROGRESS NOTES
"Napoleon is a 79 year old who is being evaluated via a billable telephone visit.    What phone number would you like to be contacted at? [phone]  How would you like to obtain your AVS? Mail a copy  Originating Location (pt. Location): Home    Distant Location (provider location):  On-site    Assessment & Plan     Infection due to 2019 novel coronavirus  New medication education completed with potential risks, benefits, administration, and potential side effects.     Holding garlic and simvastatin     - nirmatrelvir and ritonavir (PAXLOVID) 300 mg/100 mg therapy pack; Take 3 tablets by mouth 2 times daily for 5 days (Take 2 Nirmatrelvir tablets and 1 Ritonavir tablet twice daily for 5 days)    Prescription drug management  No LOS data to display   Time spent by me doing chart review, history and exam, documentation and further activities per the note      BMI  Estimated body mass index is 30.5 kg/m  as calculated from the following:    Height as of 9/18/23: 1.778 m (5' 10\").    Weight as of 9/18/23: 96.4 kg (212 lb 9.6 oz).         No follow-ups on file.    Subjective   Napoleon is a 79 year old, presenting for the following health issues:    No chief complaint on file.         No data to display                HPI     Underlying Medical Conditions: Age  Patient testing positive on 3/21   Test by:  at home test  Start of Symptoms: 3/17    COVID-19 Symptom Review  New or worsening difficulty breathing? no  Cough? yes     Dry or Productive?  productive  Fever?  Did not check     Highest temp past 24 hours?  NA  Chills?  no  Headache:  no  Sore throat: no  Chest pain: no  Diarrhea: no  Body aches?  no  Nasal congestion or drainage?  drainage       Appointment Scheduled:  yes    [facility]  Last Comprehensive Metabolic Panel:  Lab Results   Component Value Date     06/19/2023    POTASSIUM 4.2 06/19/2023    CHLORIDE 102 06/19/2023    CO2 21 (L) 06/19/2023    ANIONGAP 13 06/19/2023    GLC 99 " 06/19/2023    BUN 12.8 06/19/2023    CR 0.91 06/19/2023    GFRESTIMATED 87 06/19/2023    GEOVANI 9.0 06/19/2023       Liver Function Studies -   Recent Labs   Lab Test 01/28/22  1132   ALT 29     Current Outpatient Medications   Medication    Ascorbic Acid (VITAMIN C PO)    aspirin 81 MG tablet    Cholecalciferol (VITAMIN D3 PO)    dextromethorphan (DELSYM) 30 MG/5ML liquid    fish oil-omega-3 fatty acids (FISH OIL) 1000 MG capsule    GARLIC PO    IBUPROFEN PO    meclizine (ANTIVERT) 25 MG tablet    Multiple Vitamin (DAILY MULTIVITAMIN PO)    simvastatin (ZOCOR) 40 MG tablet    UNABLE TO FIND    zinc sulfate (ZINCATE) 220 MG capsule     No current facility-administered medications for this visit.         Objective           Vitals:  No vitals were obtained today due to virtual visit.    Physical Exam   General: Alert and no distress //Respiratory: No audible wheeze, cough, or shortness of breath // Psychiatric:  Appropriate affect, tone, and pace of words            Phone call duration: 15 minutes  Signed Electronically by: Angela Arce, CNP

## 2024-03-21 NOTE — TELEPHONE ENCOUNTER
Per [facility] drug interaction :    Co-administration may increase simvastatin concentrations. Co-administration contraindicated due to potential for myopathy including rhabdomyolysis. Discontinue use of simvastatin at least 12 hours prior to initiation of Paxlovid, during the 5 days of Paxlovid treatment and for 5 days after completing Paxlovid.    Patients should be advised against the use of garlic supplements whilst taking nirmatrelvir/ritonavir.    No other concerning drug interactions between Paxlovid and medications listed on medication list in EPIC.    Zoila Garcia, PharmD, BCACP  Medication Therapy Management Provider  [phone]

## 2024-03-21 NOTE — TELEPHONE ENCOUNTER
Telephone call placed to patient to check on symptoms and if he has tested for Covid.    Patient states his wife had a cold last week before he started developing cold symptoms on Sunday.  Symptoms started with nasal congestion and phlegm in his throat.  Denies fever.  Noted loss of smell and taste last evening.  Has a infrequent cough that he reports is bringing up small amount of white sputum.  Informed patient positive Covid test is necessary in order to prescribe anti-viral treatment.  Patient states his tests at home are probably bad.  Asked patient if his wife was able to  a test and he prefers to come in to the clinic for testing.    Will check with Family Practice for an overbook appointment and return call to patient.

## 2024-03-21 NOTE — TELEPHONE ENCOUNTER
Patient called back to report that he took a home test and it is POSITIVE for Covid.  Message sent to antiviral group.  Patient advised that someone will call him today to determine if he is eligible for anti-viral medication.

## 2024-03-21 NOTE — TELEPHONE ENCOUNTER
"Call placed to patient to discuss positive covid 19 results.   Underlying Medical Conditions: Age  Patient testing positive on 3/21   Test by:  at home test  Start of Symptoms: 3/17    COVID-19 Symptom Review    New or worsening difficulty breathing? no  Cough? yes     Dry or Productive?  productive  Fever?  Did not check     Highest temp past 24 hours?  NA  Chills?  no  Headache:  no  Sore throat: no  Chest pain: no  Diarrhea: no  Body aches?  no  Nasal congestion or drainage?  drainage       Appointment Scheduled:  yes    [facility]        Instructions below provided to patient. Patient verbalized understanding.     Instructions for Patients  Your COVID-19 test was positive. This means you have the virus. Please follow the \"How can I take care of myself\" and \"How do I self-isolate?\" guidelines in these instructions.    What treatments are available?  Over-the-counter medicines may help with your symptoms such as runny or stuffy nose, cough, chills, and fever. Talk to your care team about your options.     Some people are at high risk for severe illness (for example if you have a weak immune system, you're 65 or older, or you have certain medical problems). If your risk it high and your symptoms started in the last 5 to 7 days, we strongly recommend for you to get COVID treatment as soon as possible before you get really sick. Paxlovid, Molnupiravir and the monoclonal antibody treatments are proven safe and effective, make you feel better faster, and prevent hospitalization and death.       What are the symptoms of COVID-19?  Symptoms can include fever, cough, shortness of breath, chills, headache, muscle pain sore throat, fatigue, runny or stuffy nose, and loss of taste and smell. Other less common symptoms include nausea, vomiting, or diarrhea (watery stools).    Know when to call 911. Emergency warning signs include:  Trouble breathing or shortness of breath  Pain or pressure in the chest that " doesn't go away  Feeling confused like you haven't felt before, or not being able to wake up  Bluish-colored lips or face    How can I take care of myself?  Get lots of rest. Drink extra fluids (unless a doctor has told you not to).  Take Tylenol (acetaminophen) for fever or pain. If you have liver or kidney problems, ask your family doctor if it's okay to take Tylenol   Adults:   650 mg (two 325 mg pills or tablets) every 4 to 6 hours, or...   1,000 mg (two 500 mg pills or tablets) every 8 hours as needed.  Note: Don't take more than 3,000 mg in one day. Acetaminophen is found in many medicines (both prescribed and over the counter). Read all labels to be sure you don't take too much.  For children, check the Tylenol bottle for the right dose. The dose is based on the child's age or weight.  Take over the counter medicines for your symptoms as needed. Talk to your pharmacist.  If you have other health problems (like cancer, heart failure, an organ transplant, or severe kidney disease): Call your specialty clinic if you don't feel better in the next 2 days.    These guidelines are for isolating and quarantining before returning to work, school or .   For employers, schools and day cares: This is an official notice for this person's medical guidelines for returning in-person.   For health care sites: The CDC gives different isolation and quarantine guidelines for healthcare sites, please check with these sites before arriving.     How do I self-isolate?  You isolate when you have symptoms of COVID or a test shows you have COVID, even if you don't have symptoms.   If you DO have symptoms:  Stay home and away from others  For at least 5 days after your symptoms started, AND   You are fever free for 24 hours (with no medicine that reduces fever), AND  Your other symptoms are better.  Wear a mask for 10 full days any time you are around others.  If you DON'T have symptoms:  Stay at home and away from others for at  least 5 days after your positive test.  Wear a mask for 10 full days any time you are around others.    How and when do I quarantine?  You quarantine when you may have been exposed to the virus and DON'T have symptoms.   Stay home and away from others.   You must quarantine for 5 days after your last contact with a person who has COVID.  Note: If you are fully vaccinated, you don't need to quarantine. You should still follow the steps below.   Wear a mask for 10 full days any time you're around others.  Get tested at least 5 days after you were exposed, even if you don't have symptoms.   If you start to have symptoms, isolate right away and get tested.    Where can I get more information?  [facility] COVID-19 Resource Hub: [email or web address]   [facility] Quarantine & Isolation: https://www.cdc.gov/coronavirus/2019-ncov/your-health/quarantine-isolation.html   [facility] - What to Do If You're Sick: https://www.cdc.gov/coronavirus/2019-ncov/if-you-are-sick/index.html  [facility] clinical trials (COVID-19 research studies): clinicalaffairs.[facility]/umn-clinical-trials  [place] Department of Health COVID-19 Public Hotline: 1-473.672.7495

## 2024-03-21 NOTE — TELEPHONE ENCOUNTER
"[facility] care team    Patient called to explain he \"believes\" he has covid. Symptoms began Sunday (03/17/24) He did NOT test.  Requesting call from nurse to advise.  His phone is [phone]     Thank you  "

## 2024-03-21 NOTE — PATIENT INSTRUCTIONS
For informational purposes only. Not to replace the advice of your health care provider. Copyright   2022 [facility]. All rights reserved. Clinically reviewed by Edith Boateng, PharmD, BCACP. [employer] [id number] - REV 06/23.  COVID-19 Outpatient Treatments  Your care team can help you find the best treatments for COVID-19. Talk to a health care provider or refer to the FDA medicine fact sheets below.  Paxlovid (nirmatrelvir and ritonavir): https://www.paxlovid.[employer]/resources  Molnupiravir (Lagevrio): https://www.fda.gov/media/776383/download  Important: We can only prescribe Paxlovid or Molnupiravir when it can be started within 5 days of first having symptoms.  Paxlovid (nimatrelvir and ritonavir)  How it works  Two medicines (nirmatrelvir and ritonavir) are taken together. They stop the virus from growing. Less amount of virus is easier for your body to fight.  Benefits  Lowers risk of a hospital stay or death from COVID-19.  How to take  Medicine comes in a daily container with both medicine tablets. Take by mouth twice daily (once in the morning, once at night) for 5 days.  The number of tablets to take varies by patient.  Don't chew or break capsules. Swallow whole.  When to take  Take it as soon as possible and within 5 days of your first symptoms.  Who can take it  Patients must be 12 years or older weigh at least 88 pounds. Paxlovid is the preferred treatment for pregnant patients.  Possible side effects  Can cause altered sense of taste, diarrhea (loose, watery stools), high blood pressure, muscle aches.  Medicine conflicts  Some medicines may conflict with Paxlovid and may cause serious side effects.  Tell your care team about all the medicines you take, including prescription and over-the-counter medicines, vitamins, and herbal supplements.  Your care team will review your medicines to make sure that you can safely take Paxlovid.  Cautions  Paxlovid is not advised for patients with severe  kidney or liver disease. If you have kidney or liver problems, the dose may need to be changed.  If you're pregnant or breastfeeding, talk to your care team about your options.  If you take hormonal birth control (such as the Pill), then you or your partner should also use a non-hormonal form of birth control (such as a condom). Keep doing this for 1 menstrual cycle after your last dose of Paxlovid.  Molnupiravir (lagevrio)  How it works  Stops the virus from growing. Less amount of virus is easier for your body to fight.  Benefits  Lowers risk of a hospital stay or death from COVID-19.  How to take  Take 4 capsules by mouth every 12 hours (4 in the morning and 4 at night) for 5 days. Don't chew or break capsules. Swallow whole.  When to take  Take as soon as possible and within 5 days of your first symptoms.  Who can take it  Patients must be 18 years or older.   Possible side effects  Diarrhea (loose, watery stools), nausea (feeling sick to your stomach), dizziness, headaches.  Medicine conflicts  Right now, there are no known conflicts with other drugs. But tell your care team about all medicines you take.  Cautions  This medicine is not advised for patients who are pregnant.  If you are someone who could become pregnant, use trusted birth control until 4 days after your last dose of molnupiravir.  If your partner could become pregnant, you should use trusted birth control until 3 months after your last dose of molnupiravir.      Instructions for Patients      What are the symptoms of COVID-19?  Symptoms can include fever, cough, shortness of breath, chills, headache, muscle pain sore throat, fatigue, runny or stuffy nose, and loss of taste and smell. Other less common symptoms include nausea, vomiting, or diarrhea (watery stools).    Know when to call 911. Emergency warning signs include:  Trouble breathing or shortness of breath  Pain or pressure in the chest that doesn't go away  Feeling confused like you  haven't felt before, or not being able to wake up  Bluish-colored lips or face    How can I take care of myself?  Get lots of rest. Drink extra fluids (unless a doctor has told you not to).  Take Tylenol (acetaminophen) for fever or pain. If you have liver or kidney problems, ask your family doctor if it's okay to take Tylenol   Adults can take either:   650 mg (two 325 mg pills or tablets) every 4 to 6 hours, or...   1,000 mg (two 500 mg pills) every 8 hours as needed.  Note: Don't take more than 3,000 mg in one day. Acetaminophen is found in many medicines (both prescribed and over the counter). Read all labels to be sure you don't take too much.  For children, check the Tylenol bottle for the right dose. The dose is based on the child's age or weight.  Take over the counter medicines for your symptoms as needed. Talk to your pharmacist.  If you have other health problems (like cancer, heart failure, an organ transplant, or severe kidney disease): Call your specialty clinic if you don't feel better in the next 2 days.    Where can I get more information?  [facility] COVID-19 Resource Hub: [email or web address]   CDC Quarantine & Isolation: https://www.cdc.gov/coronavirus/2019-ncov/your-health/quarantine-isolation.html   CDC - What to Do If You're Sick: https://www.cdc.gov/coronavirus/2019-ncov/if-you-are-sick/index.html  Learn about the ACTIV-6 Clinical Trial: activ6.[facility] or call (054)-200-7331  Coping with Life After COVID-19  Being in the hospital because of COVID-19 is scary. Going home can be scary, too. You may face changes to your life, the way you work or what you can eat. It s hard to adjust to change, and it s normal to feel afraid, frustrated or even angry. These feelings usually go away over time. If your feelings don t start to get better, it s called  adjustment disorder.      What signs should I look out for?  Adjustment disorder can happen to anyone in a time of stress. It makes  it hard to cope with daily life. You may feel lonely or fight with loved ones, even if you re glad to be home. Watch for these signs:  Fear or worry  Hard time focusing  Sadness or anger  Trouble talking to family or friends  Feeling like you don t fit in or isolating yourself  Problems with sleep   Drinking alcohol or taking drugs to cope    What can I do?  You can help yourself get better. Feeling you have control helps you move forward. You may wonder if you ll be able to do things you did before. Be patient. Do your best to make the most of every day. Try to build relationships, be as active as you can, eat right and keep a sense of humor. Avoid smoking and drinking too much alcohol. Call your family doctor or clinic if you re not sure what to do. They can guide you to care or other services.    When should I get help?  Think about getting counseling if your sadness or frustration gets worse. Together with a trained counselor, you can talk about your problems adjusting to life after your hospital stay. You can come up with new ways to handle changes that give you more control. Your family doctor or care team can help you find a counselor.     Get help if you re thinking about hurting yourself. If you need help right away, call 911 or go to the nearest emergency room. You can also try the Crisis Text Line.    Crisis Text Line: 545-576 (http://www.crisistextline.org)  The Crisis Text Line serves anyone, in any crisis. It gives free, 24/7 support. Here's how it works:  Text HOME to 724575 from anywhere in the [place], anytime, about any type of crisis.  A live, trained Crisis Counselor will text you back quickly.  The volunteer Crisis Counselor can help you move from a  hot moment  to a  cool moment.  They can also help you work out a safety plan.

## 2024-05-01 ENCOUNTER — TELEPHONE (OUTPATIENT)
Dept: FAMILY MEDICINE | Facility: OTHER | Age: 79
End: 2024-05-01

## 2024-05-25 ENCOUNTER — TRANSFERRED RECORDS (OUTPATIENT)
Dept: HEALTH INFORMATION MANAGEMENT | Facility: CLINIC | Age: 79
End: 2024-05-25